# Patient Record
Sex: FEMALE | Race: BLACK OR AFRICAN AMERICAN | Employment: FULL TIME | URBAN - METROPOLITAN AREA
[De-identification: names, ages, dates, MRNs, and addresses within clinical notes are randomized per-mention and may not be internally consistent; named-entity substitution may affect disease eponyms.]

---

## 2019-04-11 ENCOUNTER — OP HISTORICAL/CONVERTED ENCOUNTER (OUTPATIENT)
Dept: OTHER | Age: 34
End: 2019-04-11

## 2019-05-07 ENCOUNTER — OP HISTORICAL/CONVERTED ENCOUNTER (OUTPATIENT)
Dept: OTHER | Age: 34
End: 2019-05-07

## 2019-12-03 ENCOUNTER — OP HISTORICAL/CONVERTED ENCOUNTER (OUTPATIENT)
Dept: OTHER | Age: 34
End: 2019-12-03

## 2021-07-30 ENCOUNTER — OFFICE VISIT (OUTPATIENT)
Dept: OBGYN CLINIC | Age: 36
End: 2021-07-30
Payer: COMMERCIAL

## 2021-07-30 VITALS
OXYGEN SATURATION: 99 % | BODY MASS INDEX: 35.56 KG/M2 | WEIGHT: 193.25 LBS | SYSTOLIC BLOOD PRESSURE: 124 MMHG | HEIGHT: 62 IN | HEART RATE: 87 BPM | DIASTOLIC BLOOD PRESSURE: 74 MMHG

## 2021-07-30 DIAGNOSIS — Z12.4 SCREENING FOR MALIGNANT NEOPLASM OF CERVIX: ICD-10-CM

## 2021-07-30 DIAGNOSIS — Z01.419 ROUTINE GYNECOLOGICAL EXAMINATION: Primary | ICD-10-CM

## 2021-07-30 DIAGNOSIS — N92.6 IRREGULAR MENSES: ICD-10-CM

## 2021-07-30 PROCEDURE — 99395 PREV VISIT EST AGE 18-39: CPT | Performed by: OBSTETRICS & GYNECOLOGY

## 2021-07-30 RX ORDER — NORGESTIMATE AND ETHINYL ESTRADIOL 0.25-0.035
1 KIT ORAL DAILY
Qty: 1 DOSE PACK | Refills: 13 | Status: SHIPPED | OUTPATIENT
Start: 2021-07-30 | End: 2022-10-27 | Stop reason: ALTCHOICE

## 2021-07-30 NOTE — PROGRESS NOTES
Homero Chadwick is a 38 y.o. female who presents today for the following:  Chief Complaint   Patient presents with    Annual Exam     c/o irregular bleeding and painful intercourse        No Known Allergies    Current Outpatient Medications   Medication Sig    norgestimate-ethinyl estradioL (ORTHO-CYCLEN, SPRINTEC) 0.25-35 mg-mcg tab Take 1 Tablet by mouth daily. No current facility-administered medications for this visit. Past Medical History:   Diagnosis Date    GERD (gastroesophageal reflux disease)     Headache     Hypercholesterolemia     Hypertension        Past Surgical History:   Procedure Laterality Date    HX TONSIL AND ADENOIDECTOMY      DC CRYOCAUTERY OF CERVIX         Family History   Problem Relation Age of Onset    Diabetes Maternal Grandmother     Hypertension Maternal Grandmother     Diabetes Paternal Grandmother     Hypertension Paternal Grandmother        Social History     Socioeconomic History    Marital status:      Spouse name: Not on file    Number of children: Not on file    Years of education: Not on file    Highest education level: Not on file   Occupational History    Not on file   Tobacco Use    Smoking status: Former Smoker    Smokeless tobacco: Never Used   Vaping Use    Vaping Use: Never used   Substance and Sexual Activity    Alcohol use: Not Currently    Drug use: Not on file    Sexual activity: Not on file   Other Topics Concern    Not on file   Social History Narrative    Not on file     Social Determinants of Health     Financial Resource Strain:     Difficulty of Paying Living Expenses:    Food Insecurity:     Worried About [employer] [employer] in the Last Year:     [address] [employer] in the Last Year:    Transportation Needs:     Lack of Transportation (Medical):      Lack of Transportation (Non-Medical):    Physical Activity:     Days of Exercise per Week:     Minutes of Exercise per Session:    Stress:     Feeling of Stress : Social Connections:     Frequency of Communication with Friends and Family:     Frequency of Social Gatherings with Friends and Family:     Attends Yarsani Services:     Active Member of Clubs or Organizations:     Attends Club or Organization Meetings:     Marital Status:    Intimate Partner Violence:     Fear of Current or Ex-Partner:     Emotionally Abused:     Physically Abused:     Sexually Abused:          HPI  Here today for annual exam.  Patient is being on oral contraception pills as a treatment for irregular periods. Needs refill. ROS   Review of Systems   Constitutional: Negative. HENT: Negative. Eyes: Negative. Respiratory: Negative. Cardiovascular: Negative. Gastrointestinal: Negative. Genitourinary: Negative. Musculoskeletal: Negative. Skin: Negative. Neurological: Negative. Endo/Heme/Allergies: Negative. Psychiatric/Behavioral: Negative. /74 (BP 1 Location: Left upper arm, BP Patient Position: Sitting, BP Cuff Size: Adult)   Pulse 87   Ht 5' 2\" (1.575 m)   Wt 193 lb 4 oz (87.7 kg)   LMP 07/03/2021   SpO2 99%   BMI 35.35 kg/m²    OBGyn Exam   Constitutional  · Appearance: well-nourished, well developed, alert, in no acute distress    HENT  · Head and Face: appears normal    Neck  · Inspection/Palpation: normal appearance, no masses or tenderness  · Lymph Nodes: no lymphadenopathy present  · Thyroid: gland size normal, nontender, no nodules or masses present on palpation    Breasts   Symmetric, no palpable masses, no tenderness, no skin changes, no nipple abnormality, no nipple discharge, no lymphadenopathy.     Chest  · Respiratory Effort: Even and unlabored  · Auscultation: normal breath sounds    Cardiovascular  · Heart:  · Auscultation: regular rate and rhythm without murmur    Gastrointestinal  · Abdominal Examination: abdomen non-tender to palpation, normal bowel sounds, no masses present  · Liver and spleen: no hepatomegaly present, spleen not palpable  · Hernias: no hernias identified    Genitourinary  · External Genitalia: normal appearance for age, no discharge present, no tenderness present, no inflammatory lesions present, no masses present, no atrophy present  · Vagina: normal vaginal vault without central or paravaginal defects, no discharge present, no inflammatory lesions present, no masses present  · Bladder: non-tender to palpation  · Urethra: appears normal  · Cervix: normal   · Uterus: normal size, shape and consistency  · Adnexa: no adnexal tenderness present, no adnexal masses present  · Perineum: perineum within normal limits, no evidence of trauma, no rashes or skin lesions present  · Anus: anus within normal limits, no hemorrhoids present  · Inguinal Lymph Nodes: no lymphadenopathy present    Skin  · General Inspection: no rash, no lesions identified    Neurologic/Psychiatric  · Mental Status:  · Orientation: grossly oriented to person, place and time  · Mood and Affect: mood normal, affect appropriate    No results found for this visit on 07/30/21. Orders Placed This Encounter    norgestimate-ethinyl estradioL (Susen Cassandraort) 0.25-35 mg-mcg tab     Sig: Take 1 Tablet by mouth daily. Dispense:  1 Dose Pack     Refill:  13    PAP IG, CT-NG-TV, APTIMA HPV AND RFX 45/40,31([id number]) ([facility])     Order Specific Question:   Pap Source? Answer:   Endocervical     Order Specific Question:   Total Hysterectomy? Answer:   No     Order Specific Question:   Supracervical Hysterectomy? Answer:   No     Order Specific Question:   Post Menopausal?     Answer:   No     Order Specific Question:   Hormone Therapy? Answer:   No     Order Specific Question:   IUD? Answer:   No     Order Specific Question:   Abnormal Bleeding? Answer:   No     Order Specific Question:   Pregnant     Answer:   No     Order Specific Question:   Post Partum?      Answer:   No     Order Specific Question: Date of LMP? Answer:   7/3/2021         1. Routine gynecological examination  Reviewed Pap smear/HPV, mammogram, bone density colonoscopy testing guidelines. Encouraged healthy lifestyle. Discussed importanceof getting annual exams. Discussed the risk of osteoporosis and recommended 1200 to 1500 mg of calcium as well as vitamin D supplementation daily. Recommended monthly self breast exams and instructed and demonstrated to the patient on the proper technique educated on the importance of healthy weight management and the significance of not smoking. 2. Screening for malignant neoplasm of cervix    - PAP IG, CT-NG-TV, APTIMA HPV AND RFX 58/54,12([id number])    3. Irregular menses    - norgestimate-ethinyl estradioL (Gemma Mcmillan) 0.25-35 mg-mcg tab; Take 1 Tablet by mouth daily. Dispense: 1 Dose Pack;  Refill: 13        Follow-up and Dispositions    · Return in about 1 year (around 7/30/2022) for Annual Exam.

## 2021-08-02 LAB
C TRACH RRNA CVX QL NAA+PROBE: NEGATIVE
CYTOLOGIST CVX/VAG CYTO: NORMAL
CYTOLOGY CVX/VAG DOC CYTO: NORMAL
CYTOLOGY CVX/VAG DOC THIN PREP: NORMAL
DX ICD CODE: NORMAL
HPV I/H RISK 4 DNA CVX QL PROBE+SIG AMP: NEGATIVE
Lab: NORMAL
N GONORRHOEA RRNA CVX QL NAA+PROBE: NEGATIVE
OTHER STN SPEC: NORMAL
STAT OF ADQ CVX/VAG CYTO-IMP: NORMAL
T VAGINALIS RRNA SPEC QL NAA+PROBE: NEGATIVE

## 2022-03-06 ENCOUNTER — HOSPITAL ENCOUNTER (EMERGENCY)
Age: 37
Discharge: HOME | End: 2022-03-07
Payer: COMMERCIAL

## 2022-03-06 DIAGNOSIS — R55 NEAR SYNCOPE: ICD-10-CM

## 2022-03-06 DIAGNOSIS — F41.1 ANXIETY STATE: ICD-10-CM

## 2022-03-06 DIAGNOSIS — F43.21 FEELING GRIEF: ICD-10-CM

## 2022-03-06 DIAGNOSIS — R42 DIZZINESS: Primary | ICD-10-CM

## 2022-03-06 PROCEDURE — 99284 EMERGENCY DEPT VISIT MOD MDM: CPT

## 2022-03-06 NOTE — Clinical Note
[facility] EMERGENCY DEPT  [address] [place] [id number]  [phone]    Work/School Note    Date: 3/6/2022    To Whom It May concern:    Katy Ojeda was seen and treated today in the emergency room by the following provider(s):  Nurse Practitioner: Ji Jolly NP. Katy Ojeda is excused from work/school on 03/07/22 and 03/08/22. She is medically clear to return to work/school on 3/9/2022.        Sincerely,          Esther Nguyen NP

## 2022-03-07 VITALS
RESPIRATION RATE: 16 BRPM | WEIGHT: 196 LBS | HEIGHT: 65 IN | DIASTOLIC BLOOD PRESSURE: 85 MMHG | SYSTOLIC BLOOD PRESSURE: 140 MMHG | OXYGEN SATURATION: 100 % | TEMPERATURE: 98.3 F | BODY MASS INDEX: 32.65 KG/M2 | HEART RATE: 94 BPM

## 2022-03-07 LAB
ALBUMIN SERPL-MCNC: 3.4 G/DL (ref 3.5–5)
ALBUMIN/GLOB SERPL: 0.8 {RATIO} (ref 1.1–2.2)
ALP SERPL-CCNC: 98 U/L (ref 45–117)
ALT SERPL-CCNC: 19 U/L (ref 12–78)
ANION GAP SERPL CALC-SCNC: 5 MMOL/L (ref 5–15)
APPEARANCE UR: ABNORMAL
AST SERPL W P-5'-P-CCNC: 16 U/L (ref 15–37)
ATRIAL RATE: 97 BPM
BACTERIA URNS QL MICRO: NEGATIVE /HPF
BASOPHILS # BLD: 0 K/UL (ref 0–0.1)
BASOPHILS NFR BLD: 0 % (ref 0–1)
BILIRUB SERPL-MCNC: 0.3 MG/DL (ref 0.2–1)
BILIRUB UR QL: NEGATIVE
BUN SERPL-MCNC: 9 MG/DL (ref 6–20)
BUN/CREAT SERPL: 12 (ref 12–20)
CA-I BLD-MCNC: 9.6 MG/DL (ref 8.5–10.1)
CALCULATED P AXIS, ECG09: 56 DEGREES
CALCULATED R AXIS, ECG10: 11 DEGREES
CALCULATED T AXIS, ECG11: 34 DEGREES
CHLORIDE SERPL-SCNC: 104 MMOL/L (ref 97–108)
CO2 SERPL-SCNC: 26 MMOL/L (ref 21–32)
COLOR UR: ABNORMAL
CREAT SERPL-MCNC: 0.75 MG/DL (ref 0.55–1.02)
DIAGNOSIS, 93000: NORMAL
DIFFERENTIAL METHOD BLD: ABNORMAL
EOSINOPHIL # BLD: 0 K/UL (ref 0–0.4)
EOSINOPHIL NFR BLD: 0 % (ref 0–7)
ERYTHROCYTE [DISTWIDTH] IN BLOOD BY AUTOMATED COUNT: 12.3 % (ref 11.5–14.5)
GLOBULIN SER CALC-MCNC: 4.5 G/DL (ref 2–4)
GLUCOSE SERPL-MCNC: 133 MG/DL (ref 65–100)
GLUCOSE UR STRIP.AUTO-MCNC: NEGATIVE MG/DL
HCG SERPL QL: NEGATIVE
HCT VFR BLD AUTO: 40.4 % (ref 35–47)
HGB BLD-MCNC: 13.5 G/DL (ref 11.5–16)
HGB UR QL STRIP: NEGATIVE
IMM GRANULOCYTES # BLD AUTO: 0.1 K/UL (ref 0–0.04)
IMM GRANULOCYTES NFR BLD AUTO: 0 % (ref 0–0.5)
KETONES UR QL STRIP.AUTO: NEGATIVE MG/DL
LEUKOCYTE ESTERASE UR QL STRIP.AUTO: NEGATIVE
LYMPHOCYTES # BLD: 2.3 K/UL (ref 0.8–3.5)
LYMPHOCYTES NFR BLD: 18 % (ref 12–49)
MCH RBC QN AUTO: 29 PG (ref 26–34)
MCHC RBC AUTO-ENTMCNC: 33.4 G/DL (ref 30–36.5)
MCV RBC AUTO: 86.7 FL (ref 80–99)
MONOCYTES # BLD: 0.9 K/UL (ref 0–1)
MONOCYTES NFR BLD: 7 % (ref 5–13)
MUCOUS THREADS URNS QL MICRO: ABNORMAL /LPF
NEUTS SEG # BLD: 9.3 K/UL (ref 1.8–8)
NEUTS SEG NFR BLD: 75 % (ref 32–75)
NITRITE UR QL STRIP.AUTO: NEGATIVE
NRBC # BLD: 0 K/UL (ref 0–0.01)
NRBC BLD-RTO: 0 PER 100 WBC
P-R INTERVAL, ECG05: 138 MS
PH UR STRIP: 5 [PH] (ref 5–8)
PLATELET # BLD AUTO: 271 K/UL (ref 150–400)
PMV BLD AUTO: 10.8 FL (ref 8.9–12.9)
POTASSIUM SERPL-SCNC: 3.3 MMOL/L (ref 3.5–5.1)
PROT SERPL-MCNC: 7.9 G/DL (ref 6.4–8.2)
PROT UR STRIP-MCNC: NEGATIVE MG/DL
Q-T INTERVAL, ECG07: 350 MS
QRS DURATION, ECG06: 62 MS
QTC CALCULATION (BEZET), ECG08: 444 MS
RBC # BLD AUTO: 4.66 M/UL (ref 3.8–5.2)
RBC #/AREA URNS HPF: ABNORMAL /HPF (ref 0–5)
SODIUM SERPL-SCNC: 135 MMOL/L (ref 136–145)
SP GR UR REFRACTOMETRY: 1.02 (ref 1–1.03)
TROPONIN-HIGH SENSITIVITY: 4 NG/L (ref 0–51)
TSH SERPL DL<=0.05 MIU/L-ACNC: 1.61 UIU/ML (ref 0.36–3.74)
UA: UC IF INDICATED,UAUC: ABNORMAL
UROBILINOGEN UR QL STRIP.AUTO: 0.1 EU/DL (ref 0.1–1)
VENTRICULAR RATE, ECG03: 97 BPM
WBC # BLD AUTO: 12.5 K/UL (ref 3.6–11)
WBC URNS QL MICRO: ABNORMAL /HPF (ref 0–4)

## 2022-03-07 PROCEDURE — 84484 ASSAY OF TROPONIN QUANT: CPT

## 2022-03-07 PROCEDURE — 74011250637 HC RX REV CODE- 250/637: Performed by: NURSE PRACTITIONER

## 2022-03-07 PROCEDURE — 36415 COLL VENOUS BLD VENIPUNCTURE: CPT

## 2022-03-07 PROCEDURE — 80053 COMPREHEN METABOLIC PANEL: CPT

## 2022-03-07 PROCEDURE — 93005 ELECTROCARDIOGRAM TRACING: CPT

## 2022-03-07 PROCEDURE — 84443 ASSAY THYROID STIM HORMONE: CPT

## 2022-03-07 PROCEDURE — 84703 CHORIONIC GONADOTROPIN ASSAY: CPT

## 2022-03-07 PROCEDURE — 85025 COMPLETE CBC W/AUTO DIFF WBC: CPT

## 2022-03-07 PROCEDURE — 81001 URINALYSIS AUTO W/SCOPE: CPT

## 2022-03-07 RX ORDER — HYDROXYZINE PAMOATE 25 MG/1
25 CAPSULE ORAL
Status: COMPLETED | OUTPATIENT
Start: 2022-03-07 | End: 2022-03-07

## 2022-03-07 RX ORDER — ATORVASTATIN CALCIUM 10 MG/1
TABLET, FILM COATED ORAL DAILY
COMMUNITY
End: 2022-10-27 | Stop reason: ALTCHOICE

## 2022-03-07 RX ORDER — HYDROXYZINE 25 MG/1
25 TABLET, FILM COATED ORAL
Qty: 10 TABLET | Refills: 0 | Status: SHIPPED | OUTPATIENT
Start: 2022-03-07 | End: 2022-03-17

## 2022-03-07 RX ORDER — LISINOPRIL 10 MG/1
TABLET ORAL DAILY
COMMUNITY
End: 2022-10-27 | Stop reason: ALTCHOICE

## 2022-03-07 RX ADMIN — HYDROXYZINE PAMOATE 25 MG: 25 CAPSULE ORAL at 02:07

## 2022-03-07 NOTE — ED TRIAGE NOTES
Pt reports earlier in the day that she felt dizzy and weak had a fall. Denies LOC or hitting head.  Pt has hx of anxiety

## 2022-03-07 NOTE — DISCHARGE INSTRUCTIONS
Thank you! Thank you for allowing me to care for you in the emergency department. I sincerely hope that you are satisfied with your visit today. It is my goal to provide you with excellent care. Below you will find a list of your labs and imaging from your visit today. Should you have any questions regarding these results please do not hesitate to call the emergency department. Labs -     Recent Results (from the past 12 hour(s))   CBC WITH AUTOMATED DIFF    Collection Time: 03/07/22 12:05 AM   Result Value Ref Range    WBC 12.5 (H) 3.6 - 11.0 K/uL    RBC 4.66 3.80 - 5.20 M/uL    HGB 13.5 11.5 - 16.0 g/dL    HCT 40.4 35.0 - 47.0 %    MCV 86.7 80.0 - 99.0 FL    MCH 29.0 26.0 - 34.0 PG    MCHC 33.4 30.0 - 36.5 g/dL    RDW 12.3 11.5 - 14.5 %    PLATELET 749 079 - 493 K/uL    MPV 10.8 8.9 - 12.9 FL    NRBC 0.0 0.0  WBC    ABSOLUTE NRBC 0.00 0.00 - 0.01 K/uL    NEUTROPHILS 75 32 - 75 %    LYMPHOCYTES 18 12 - 49 %    MONOCYTES 7 5 - 13 %    EOSINOPHILS 0 0 - 7 %    BASOPHILS 0 0 - 1 %    IMMATURE GRANULOCYTES 0 0 - 0.5 %    ABS. NEUTROPHILS 9.3 (H) 1.8 - 8.0 K/UL    ABS. LYMPHOCYTES 2.3 0.8 - 3.5 K/UL    ABS. MONOCYTES 0.9 0.0 - 1.0 K/UL    ABS. EOSINOPHILS 0.0 0.0 - 0.4 K/UL    ABS. BASOPHILS 0.0 0.0 - 0.1 K/UL    ABS. IMM. GRANS. 0.1 (H) 0.00 - 0.04 K/UL    DF AUTOMATED     METABOLIC PANEL, COMPREHENSIVE    Collection Time: 03/07/22 12:05 AM   Result Value Ref Range    Sodium 135 (L) 136 - 145 mmol/L    Potassium 3.3 (L) 3.5 - 5.1 mmol/L    Chloride 104 97 - 108 mmol/L    CO2 26 21 - 32 mmol/L    Anion gap 5 5 - 15 mmol/L    Glucose 133 (H) 65 - 100 mg/dL    BUN 9 6 - 20 mg/dL    Creatinine 0.75 0.55 - 1.02 mg/dL    BUN/Creatinine ratio 12 12 - 20      GFR est AA >60 >60 ml/min/1.73m2    GFR est non-AA >60 >60 ml/min/1.73m2    Calcium 9.6 8.5 - 10.1 mg/dL    Bilirubin, total 0.3 0.2 - 1.0 mg/dL    AST (SGOT) 16 15 - 37 U/L    ALT (SGPT) 19 12 - 78 U/L    Alk.  phosphatase 98 45 - 117 U/L    Protein, total 7.9 6.4 - 8.2 g/dL    Albumin 3.4 (L) 3.5 - 5.0 g/dL    Globulin 4.5 (H) 2.0 - 4.0 g/dL    A-G Ratio 0.8 (L) 1.1 - 2.2     URINALYSIS W/ REFLEX CULTURE    Collection Time: 03/07/22 12:05 AM    Specimen: Urine   Result Value Ref Range    Color Yellow/Straw      Appearance Turbid (A) Clear      Specific gravity 1.017 1.003 - 1.030      pH (UA) 5.0 5.0 - 8.0      Protein Negative Negative mg/dL    Glucose Negative Negative mg/dL    Ketone Negative Negative mg/dL    Bilirubin Negative Negative      Blood Negative Negative      Urobilinogen 0.1 0.1 - 1.0 EU/dL    Nitrites Negative Negative      Leukocyte Esterase Negative Negative      UA:UC IF INDICATED PENDING     WBC 0-4 0 - 4 /hpf    RBC 0-5 0 - 5 /hpf    Bacteria Negative Negative /hpf    Mucus Trace /lpf   TROPONIN-HIGH SENSITIVITY    Collection Time: 03/07/22 12:05 AM   Result Value Ref Range    Troponin-High Sensitivity 4 0 - 51 ng/L   HCG QL SERUM    Collection Time: 03/07/22 12:05 AM   Result Value Ref Range    HCG, Ql. Negative Negative     TSH 3RD GENERATION    Collection Time: 03/07/22 12:05 AM   Result Value Ref Range    TSH 1.61 0.36 - 3.74 uIU/mL       Radiologic Studies -   No orders to display     CT Results  (Last 48 hours)      None          CXR Results  (Last 48 hours)      None               If you feel that you have not received excellent quality care or timely care, please ask to speak to the nurse manager. Please choose us in the future for your continued health care needs. ------------------------------------------------------------------------------------------------------------  The exam and treatment you received in the Emergency Department were for an urgent problem and are not intended as complete care. It is important that you follow-up with a doctor, nurse practitioner, or physician assistant to:  (1) confirm your diagnosis,  (2) re-evaluation of changes in your illness and treatment, and  (3) for ongoing care.   If your symptoms become worse or you do not improve as expected and you are unable to reach your usual health care provider, you should return to the Emergency Department. We are available 24 hours a day. Please take your discharge instructions with you when you go to your follow-up appointment. If you have any problem arranging a follow-up appointment, contact the Emergency Department immediately. If a prescription has been provided, please have it filled as soon as possible to prevent a delay in treatment. Read the entire medication instruction sheet provided to you by the pharmacy. If you have any questions or reservations about taking the medication due to side effects or interactions with other medications, please call your primary care physician or contact the ER to speak with the charge nurse. Make an appointment with your family doctor or the physician you were referred to for follow-up of this visit as instructed on your discharge paperwork, as this is a mandatory follow-up. Return to the ER if you are unable to be seen or if you are unable to be seen in a timely manner. If you have any problem arranging the follow-up visit, contact the Emergency Department immediately.

## 2022-03-07 NOTE — ED PROVIDER NOTES
EMERGENCY DEPARTMENT HISTORY AND PHYSICAL EXAM      Date: 3/6/2022  Patient Name: Maribel Bird    History of Presenting Illness     Chief Complaint   Patient presents with    Dizziness       History Provided By: Patient and Patient's Mother    HPI: Maribel Bird, 43 y.o. female with a past medical history significant hypertension, hyperlipidemia, obesity and anxiety, phobia, depression presents to the ED with cc of dizziness with near syncope this afternoon. Patient and significant other's mother both relay that they were having a pleasant conversation over lunch and upon standing and ambulating the patient describes becoming light-headed with the room spinning and black spots and she had a guided descent to the floor without injury and returned to her usual LOC shortly after with no return of symptoms. She did not want to come to the ED earlier in the day due to phobia of hospitals and anxiety but has come now with family support. She reports increased anxiety, difficulty sleeping, poor eating habits, drinking soda and not water, and not following up with her PCP for her htn since her mother's passing several months ago. Denies SI or any attempts, AH, VH, or other psychiatric history. There are no other complaints, changes, or physical findings at this time. PCP: None    No current facility-administered medications on file prior to encounter. Current Outpatient Medications on File Prior to Encounter   Medication Sig Dispense Refill    lisinopriL (PRINIVIL, ZESTRIL) 10 mg tablet Take  by mouth daily.  atorvastatin (LIPITOR) 10 mg tablet Take  by mouth daily.  norgestimate-ethinyl estradioL (ORTHO-CYCLEN, SPRINTEC) 0.25-35 mg-mcg tab Take 1 Tablet by mouth daily.  (Patient not taking: Reported on 3/7/2022) 1 Dose Pack 13       Past History     Past Medical History:  Past Medical History:   Diagnosis Date    Anxiety     GERD (gastroesophageal reflux disease)     Headache     Hypercholesterolemia     Hypertension     Obesity     Phobia     Psychiatric disorder        Past Surgical History:  Past Surgical History:   Procedure Laterality Date    HX TONSIL AND ADENOIDECTOMY      [place] CRYOCAUTERY OF CERVIX         Family History:  Family History   Problem Relation Age of Onset    Diabetes Maternal Grandmother     Hypertension Maternal Grandmother     Diabetes Paternal Grandmother     Hypertension Paternal Grandmother        Social History:  Social History     Tobacco Use    Smoking status: Former Smoker    Smokeless tobacco: Never Used   Vaping Use    Vaping Use: Never used   Substance Use Topics    Alcohol use: Not Currently    Drug use: Never       Allergies:  No Known Allergies      Review of Systems     Review of Systems   Constitutional: Negative for activity change, appetite change, chills, diaphoresis, fatigue, fever and unexpected weight change. HENT: Negative for congestion, nosebleeds and tinnitus. Eyes: Negative for photophobia and visual disturbance. Respiratory: Negative for cough, chest tightness, shortness of breath and wheezing. Cardiovascular: Negative for chest pain, palpitations and leg swelling. Gastrointestinal: Negative for abdominal pain, constipation, diarrhea, nausea and vomiting. Endocrine: Negative for cold intolerance and heat intolerance. Genitourinary: Positive for menstrual problem. Negative for decreased urine volume, dysuria, flank pain, frequency, pelvic pain, urgency, vaginal bleeding, vaginal discharge and vaginal pain. Musculoskeletal: Negative for back pain, myalgias and neck pain. Skin: Negative. Allergic/Immunologic: Negative. Neurological: Positive for dizziness, syncope and light-headedness. Negative for tremors, seizures, facial asymmetry, speech difficulty, weakness, numbness and headaches. Hematological: Negative.     Psychiatric/Behavioral: Negative for hallucinations, self-injury, sleep disturbance and suicidal ideas. The patient is nervous/anxious. All other systems reviewed and are negative. Physical Exam     Physical Exam  Vitals and nursing note reviewed. Constitutional:       General: She is not in acute distress. Appearance: She is well-groomed. She is obese. She is not ill-appearing, toxic-appearing or diaphoretic. HENT:      Head: Normocephalic. Right Ear: Tympanic membrane, ear canal and external ear normal.      Left Ear: Tympanic membrane, ear canal and external ear normal.      Nose: Nose normal. No congestion. Mouth/Throat:      Mouth: Mucous membranes are moist.      Pharynx: Oropharynx is clear. Eyes:      General: No scleral icterus. Extraocular Movements: Extraocular movements intact. Conjunctiva/sclera: Conjunctivae normal.      Pupils: Pupils are equal, round, and reactive to light. Cardiovascular:      Rate and Rhythm: Normal rate and regular rhythm. Pulses: Normal pulses. Heart sounds: Normal heart sounds. Pulmonary:      Effort: Pulmonary effort is normal. No respiratory distress. Breath sounds: Normal breath sounds. Chest:      Chest wall: No tenderness. Abdominal:      General: Bowel sounds are normal.      Palpations: Abdomen is soft. Tenderness: There is no abdominal tenderness. Musculoskeletal:         General: Normal range of motion. Cervical back: Normal range of motion and neck supple. Right lower leg: No edema. Left lower leg: No edema. Skin:     General: Skin is warm and dry. Capillary Refill: Capillary refill takes less than 2 seconds. Neurological:      General: No focal deficit present. Mental Status: She is alert and oriented to person, place, and time. Sensory: No sensory deficit. Motor: No weakness. Gait: Gait normal.   Psychiatric:         Attention and Perception: Attention and perception normal. She does not perceive auditory or visual hallucinations.          Mood and Affect: Mood is anxious and depressed. Affect is tearful. Speech: Speech normal. Speech is not rapid and pressured, delayed, slurred or tangential.         Behavior: Behavior normal. Behavior is cooperative. Thought Content: Thought content is not paranoid or delusional. Thought content does not include homicidal or suicidal ideation. Thought content does not include homicidal or suicidal plan. Lab and Diagnostic Study Results     Labs -   No results found for this or any previous visit (from the past 12 hour(s)). Radiologic Studies -   @lastxrresult@  CT Results  (Last 48 hours)    None        CXR Results  (Last 48 hours)    None            Medical Decision Making   - I am the first provider for this patient. - I reviewed the vital signs, available nursing notes, past medical history, past surgical history, family history and social history. - Initial assessment performed. The patients presenting problems have been discussed, and they are in agreement with the care plan formulated and outlined with them. I have encouraged them to ask questions as they arise throughout their visit. Vital Signs-Reviewed the patient's vital signs. Patient Vitals for the past 12 hrs:   Temp Pulse Resp BP SpO2   03/06/22 2337 98.3 °F (36.8 °C) 98 16 (!) 141/88 100 %       Records Reviewed: Nursing Notes and Old Medical Records    The patient presents with near syncope, anxiety, sleep difficulty, with a differential diagnosis of anxiety, grief response, hypovolemia, hyper/hypoglycemia, uncontrolled hypertension, arrhythmia, ACS. Electrolyte or metabolic disturbance, thyroid disease      ED Course:     ED Course as of 03/20/22 2357   Mon Mar 07, 2022   0105 Patient given diagnostic results thus far and reassured. She reports feeling more at ease and has not had any recurrence of dizziness or development of new symptoms. Ambulatory with steady upright gait to the bathroom for urine sample.  Mother also voices that patient is calmer and more at ease. [KR]   Workup negative for any acute abnormality including thyroid studies. Minimally elevated WBC without evidence of infection or complaints. U/A negative. Nonfasting blood glucose not excessive and without evidence of DKA. ECG unremarkable for acute process. Patient receptive to education and was also receptive to resources and conversation with behavioral health intake for grief counseling, anxiety, and phobias. She was provided with a new PCP referral and she voiced feeling greatly improved, demonstrated good family support with their presence during entire visit. Patient safe and appropriate for discharge after speaking with behavioral intake as well. ED Course User Index  [KR] Lilia Fitzpatrick NP       Provider Notes (Medical Decision Making):     MDM  Number of Diagnoses or Management Options  Anxiety state: new, no workup  Dizziness: new, needed workup  Feeling grief: new, no workup  Near syncope: new, needed workup     Amount and/or Complexity of Data Reviewed  Clinical lab tests: ordered and reviewed  Tests in the radiology section of CPT®: reviewed and ordered  Obtain history from someone other than the patient: yes  Discuss the patient with other providers: yes  Independent visualization of images, tracings, or specimens: yes    Risk of Complications, Morbidity, and/or Mortality  Presenting problems: low  Diagnostic procedures: low  Management options: low    Patient Progress  Patient progress: improved           Disposition   Disposition: Condition stable, improved and resolved  DC- Adult Discharges: All of the diagnostic tests were reviewed and questions answered. Diagnosis, care plan and treatment options were discussed. The patient understands the instructions and will follow up as directed. The patients results have been reviewed with them. They have been counseled regarding their diagnosis.   The patient verbally convey understanding and agreement of the signs, symptoms, diagnosis, treatment and prognosis and additionally agrees to follow up as recommended with their PCP in 24 - 48 hours. They also agree with the care-plan and convey that all of their questions have been answered. I have also put together some discharge instructions for them that include: 1) educational information regarding their diagnosis, 2) how to care for their diagnosis at home, as well a 3) list of reasons why they would want to return to the ED prior to their follow-up appointment, should their condition change. DISCHARGE PLAN:  1. Current Discharge Medication List      CONTINUE these medications which have NOT CHANGED    Details   norgestimate-ethinyl estradioL (ORTHO-CYCLEN, SPRINTEC) 0.25-35 mg-mcg tab Take 1 Tablet by mouth daily. Qty: 1 Dose Pack, Refills: 13    Associated Diagnoses: Irregular menses           2. Follow-up Information     Follow up With Specialties Details Why Contact Info    Your new provider at [facility] office    Call and make appointment for planning out your routine medical care and follow up with lab monitoring and medications. Follow up with primary care, urgent care, or this Emergency department   As needed, If symptoms worsen         3. Return to ED if worse   4. Discharge Medication List as of 3/7/2022  2:59 AM      START taking these medications    Details   hydrOXYzine HCL (ATARAX) 25 mg tablet Take 1 Tablet by mouth nightly as needed for Anxiety or Sleep for up to 10 days. , Normal, Disp-10 Tablet, R-0         CONTINUE these medications which have NOT CHANGED    Details   lisinopriL (PRINIVIL, ZESTRIL) 10 mg tablet Take  by mouth daily. , Historical Med      atorvastatin (LIPITOR) 10 mg tablet Take  by mouth daily. , Historical Med      norgestimate-ethinyl estradioL (ORTHO-CYCLEN, SPRINTEC) 0.25-35 mg-mcg tab Take 1 Tablet by mouth daily. , Normal, Disp-1 Dose Pack, R-13               Diagnosis     Clinical Impression:   1. Dizziness    2. Near syncope    3. Anxiety state    4. Feeling grief        Attestations:    Moo Moctezuma NP    Please note that this dictation was completed with [employer], the computer voice recognition software. Quite often unanticipated grammatical, syntax, homophones, and other interpretive errors are inadvertently transcribed by the computer software. Please disregard these errors. Please excuse any errors that have escaped final proofreading. Thank you.

## 2022-06-23 ENCOUNTER — INITIAL PRENATAL (OUTPATIENT)
Dept: OBGYN CLINIC | Age: 37
End: 2022-06-23
Payer: COMMERCIAL

## 2022-06-23 VITALS
OXYGEN SATURATION: 100 % | WEIGHT: 188.44 LBS | TEMPERATURE: 97.1 F | DIASTOLIC BLOOD PRESSURE: 94 MMHG | HEART RATE: 93 BPM | RESPIRATION RATE: 16 BRPM | SYSTOLIC BLOOD PRESSURE: 145 MMHG | HEIGHT: 65 IN | BODY MASS INDEX: 31.39 KG/M2

## 2022-06-23 DIAGNOSIS — Z11.3 SCREENING FOR STDS (SEXUALLY TRANSMITTED DISEASES): ICD-10-CM

## 2022-06-23 DIAGNOSIS — Z32.00 ENCOUNTER FOR CONFIRMATION OF PREGNANCY TEST RESULT WITH PHYSICAL EXAMINATION: Primary | ICD-10-CM

## 2022-06-23 DIAGNOSIS — O10.919 CHRONIC HYPERTENSION AFFECTING PREGNANCY: ICD-10-CM

## 2022-06-23 DIAGNOSIS — N91.2 AMENORRHEA: ICD-10-CM

## 2022-06-23 DIAGNOSIS — O09.511 PRIMIGRAVIDA OF ADVANCED MATERNAL AGE IN FIRST TRIMESTER: ICD-10-CM

## 2022-06-23 PROCEDURE — 76801 OB US < 14 WKS SINGLE FETUS: CPT | Performed by: OBSTETRICS & GYNECOLOGY

## 2022-06-23 PROCEDURE — 99213 OFFICE O/P EST LOW 20 MIN: CPT | Performed by: OBSTETRICS & GYNECOLOGY

## 2022-06-23 RX ORDER — ASPIRIN 81 MG/1
81 TABLET ORAL DAILY
Qty: 90 TABLET | Refills: 3 | Status: SHIPPED | OUTPATIENT
Start: 2022-06-23

## 2022-06-23 RX ORDER — LABETALOL 200 MG/1
200 TABLET, FILM COATED ORAL 2 TIMES DAILY
Qty: 60 TABLET | Refills: 3 | Status: SHIPPED | OUTPATIENT
Start: 2022-06-23 | End: 2022-10-14

## 2022-06-23 RX ORDER — METOPROLOL TARTRATE 25 MG/1
TABLET, FILM COATED ORAL
COMMUNITY
Start: 2022-06-07 | End: 2022-06-23

## 2022-06-23 NOTE — PROGRESS NOTES
Minnie Rosenbaum is a 43 y.o. female who presents today for the following:  Chief Complaint   Patient presents with    Initial Prenatal Visit        No Known Allergies    Current Outpatient Medications   Medication Sig    labetaloL (NORMODYNE) 200 mg tablet Take 1 Tablet by mouth two (2) times a day. Indications: pre-existing hypertension during pregnancy    aspirin delayed-release 81 mg tablet Take 1 Tablet by mouth daily.  lisinopriL (PRINIVIL, ZESTRIL) 10 mg tablet Take  by mouth daily. (Patient not taking: Reported on 6/23/2022)    atorvastatin (LIPITOR) 10 mg tablet Take  by mouth daily. (Patient not taking: Reported on 6/23/2022)    norgestimate-ethinyl estradioL (ORTHO-CYCLEN, SPRINTEC) 0.25-35 mg-mcg tab Take 1 Tablet by mouth daily. (Patient not taking: Reported on 3/7/2022)     No current facility-administered medications for this visit.        Past Medical History:   Diagnosis Date    Anxiety     GERD (gastroesophageal reflux disease)     Headache     Hypercholesterolemia     Hypertension     Obesity     Phobia     Psychiatric disorder        Past Surgical History:   Procedure Laterality Date    HX TONSIL AND ADENOIDECTOMY      [place] CRYOCAUTERY OF CERVIX         Family History   Problem Relation Age of Onset    Diabetes Maternal Grandmother     Hypertension Maternal Grandmother     Diabetes Paternal Grandmother     Hypertension Paternal Grandmother        Social History     Socioeconomic History    Marital status:      Spouse name: Not on file    Number of children: Not on file    Years of education: Not on file    Highest education level: Not on file   Occupational History    Not on file   Tobacco Use    Smoking status: Former Smoker    Smokeless tobacco: Never Used   Vaping Use    Vaping Use: Never used   Substance and Sexual Activity    Alcohol use: Not Currently    Drug use: Never    Sexual activity: Yes     Birth control/protection: None     Comment: Stopped birth control 2 months ago   Other Topics Concern    Not on file   Social History Narrative    Not on file     Social Determinants of Health     Financial Resource Strain:     Difficulty of Paying Living Expenses: Not on file   Food Insecurity:     Worried About Running Out of Food in the Last Year: Not on file    [place] of Food in the Last Year: Not on file   Transportation Needs:     Lack of Transportation (Medical): Not on file    Lack of Transportation (Non-Medical): Not on file   Physical Activity:     Days of Exercise per Week: Not on file    Minutes of Exercise per Session: Not on file   Stress:     Feeling of Stress : Not on file   Social Connections:     Frequency of Communication with Friends and Family: Not on file    Frequency of Social Gatherings with Friends and Family: Not on file    Attends Latter-day Services: Not on file    Active Member of [address] [place] [address] [employer] or Organizations: Not on file    Attends Club or Organization Meetings: Not on file    Marital Status: Not on file   Intimate Partner Violence:     Fear of Current or Ex-Partner: Not on file    Emotionally Abused: Not on file    Physically Abused: Not on file    Sexually Abused: Not on file   Housing Stability:     Unable to Pay for Housing in the Last Year: Not on file    Number of [place] in the Last Year: Not on file    Unstable Housing in the Last Year: Not on file         HPI  43years old patient presented today for pregnancy confirmation after findings of a positive pregnancy test.  She has history of chronic hypertension. ROS   Review of Systems   Constitutional: Negative. HENT: Negative. Eyes: Negative. Respiratory: Negative. Cardiovascular: Negative. Gastrointestinal: Negative. Genitourinary: Negative. Musculoskeletal: Negative. Skin: Negative. Neurological: Negative. Endo/Heme/Allergies: Negative. Psychiatric/Behavioral: Negative.       BP (!) 145/94 (BP 1 Location: Left upper arm, BP Patient Position: Sitting, BP Cuff Size: Adult)   Pulse 93   Temp 97.1 °F (36.2 °C) (Temporal)   Resp 16   Ht 5' 5\" (1.651 m)   Wt 188 lb 7 oz (85.5 kg)   LMP 04/15/2022 (Approximate)   SpO2 100%   BMI 31.36 kg/m²    OBGyn Exam   Constitutional  · Appearance: well-nourished, well developed, alert, in no acute distress    HENT  · Head and Face: appears normal    Neck  · Inspection/Palpation: normal appearance, no masses or tenderness  · Lymph Nodes: no lymphadenopathy present  · Thyroid: gland size normal, nontender, no nodules or masses present on palpation    Chest  · Respiratory Effort: Even and unlabored  · Auscultation: normal breath sounds    Cardiovascular  · Heart:  · Auscultation: regular rate and rhythm without murmur    Gastrointestinal  · Abdominal Examination: abdomen non-tender to palpation, normal bowel sounds, no masses present  · Liver and spleen: no hepatomegaly present, spleen not palpable  · Hernias: no hernias identified    Genitourinary  · External Genitalia: normal appearance for age, no discharge present, no tenderness present, no inflammatory lesions present, no masses present, no atrophy present  · Vagina: normal vaginal vault without central or paravaginal defects, no discharge present, no inflammatory lesions present, no masses present  · Bladder: non-tender to palpation  · Urethra: appears normal  · Cervix: normal   · Uterus: normal size, shape and consistency  · Adnexa: no adnexal tenderness present, no adnexal masses present  · Perineum: perineum within normal limits, no evidence of trauma, no rashes or skin lesions present  · Anus: anus within normal limits, no hemorrhoids present  · Inguinal Lymph Nodes: no lymphadenopathy present    Skin  · General Inspection: no rash, no lesions identified    Neurologic/Psychiatric  · Mental Status:  · Orientation: grossly oriented to person, place and time  · Mood and Affect: mood normal, affect appropriate    Results for orders placed or performed in visit on 06/23/22   AMB POC US OB < 14 WKS, 1ST GESTATION    Narrative    A transabdominal pelvic ultrasound performed today, June 23, 2022, shows an intrauterine, single, viable pregnancy. Embryo measures 5.46 cm of crown-rump length, compatible with 12 weeks gestation. Gestational sac measures 7.22 cm. Embryonic heart motion noted at 166 bpm.  Cervix appears closed. Diagnostic impression: Intrauterine pregnancy at 12 weeks gestation. RADHA: January 5, 2023. Orders Placed This Encounter    AMB POC US OB < 14 WKS, 1ST GESTATION     Order Specific Question:   Reason for Exam     Answer:   Dating     Order Specific Question:   Is Patient Pregnant? Answer: Yes    CT/NG/T.VAGINALIS AMPLIFICATION     Order Specific Question:   Specimen source     Answer:   Vaginal [516]    PRENATAL PROFILE I    HIV 1/2 AG/AB, 4TH GENERATION,W RFLX CONFIRM    TSH AND FREE T4 ([facility] Default)    HEPATITIS C AB    CYSTIC FIBROSIS MUTATION 97    HEMOGLOBIN FRACTIONATION    DISCONTD: metoprolol tartrate (LOPRESSOR) 25 mg tablet    labetaloL (NORMODYNE) 200 mg tablet     Sig: Take 1 Tablet by mouth two (2) times a day. Indications: pre-existing hypertension during pregnancy     Dispense:  60 Tablet     Refill:  3    aspirin delayed-release 81 mg tablet     Sig: Take 1 Tablet by mouth daily. Dispense:  90 Tablet     Refill:  3         1. Encounter for confirmation of pregnancy test result with physical examination    - PRENATAL PROFILE I  - HIV 1/2 AG/AB, 4TH GENERATION,W RFLX CONFIRM  - TSH AND FREE T4  - HEPATITIS C AB  - CYSTIC FIBROSIS MUTATION 97  - HEMOGLOBIN FRACTIONATION  - AMB POC US OB < 14 WKS, 1ST GESTATION    2. Amenorrhea      3. Screening for STDs (sexually transmitted diseases)    - CT/NG/T.VAGINALIS AMPLIFICATION    4. Chronic hypertension affecting pregnancy    - labetaloL (NORMODYNE) 200 mg tablet; Take 1 Tablet by mouth two (2) times a day.  Indications: pre-existing hypertension during pregnancy  Dispense: 60 Tablet; Refill: 3  - aspirin delayed-release 81 mg tablet; Take 1 Tablet by mouth daily. Dispense: 90 Tablet; Refill: 3    5. Primigravida of advanced maternal age in first trimester      REFERRAL TO [facility]. Follow-up and Dispositions    · Return in about 2 weeks (around 7/7/2022) for [place] [address].

## 2022-06-29 LAB
C TRACH RRNA SPEC QL NAA+PROBE: NEGATIVE
N GONORRHOEA RRNA SPEC QL NAA+PROBE: NEGATIVE
T VAGINALIS RRNA SPEC QL NAA+PROBE: NEGATIVE

## 2022-07-01 LAB
ABO GROUP BLD: NORMAL
BASOPHILS # BLD AUTO: 0 X10E3/UL (ref 0–0.2)
BASOPHILS NFR BLD AUTO: 0 %
BLD GP AB SCN SERPL QL: NEGATIVE
CFTR MUT ANL BLD/T: NORMAL
EOSINOPHIL # BLD AUTO: 0 X10E3/UL (ref 0–0.4)
EOSINOPHIL NFR BLD AUTO: 0 %
ERYTHROCYTE [DISTWIDTH] IN BLOOD BY AUTOMATED COUNT: 12.8 % (ref 11.7–15.4)
GENE DIS ANL CARRIER INTERP-IMP: NORMAL
HBV SURFACE AG SERPL QL IA: NEGATIVE
HCT VFR BLD AUTO: 38.7 % (ref 34–46.6)
HCV AB S/CO SERPL IA: <0.1 S/CO RATIO (ref 0–0.9)
HGB A MFR BLD ELPH: 97.5 % (ref 96.4–98.8)
HGB A2 MFR BLD ELPH: 2.5 % (ref 1.8–3.2)
HGB BLD-MCNC: 12.5 G/DL (ref 11.1–15.9)
HGB F MFR BLD ELPH: 0 % (ref 0–2)
HGB FRACT BLD-IMP: NORMAL
HGB S MFR BLD ELPH: 0 %
HIV 1+2 AB+HIV1 P24 AG SERPL QL IA: NON REACTIVE
IMM GRANULOCYTES # BLD AUTO: 0 X10E3/UL (ref 0–0.1)
IMM GRANULOCYTES NFR BLD AUTO: 1 %
LYMPHOCYTES # BLD AUTO: 1.9 X10E3/UL (ref 0.7–3.1)
LYMPHOCYTES NFR BLD AUTO: 22 %
MCH RBC QN AUTO: 28.3 PG (ref 26.6–33)
MCHC RBC AUTO-ENTMCNC: 32.3 G/DL (ref 31.5–35.7)
MCV RBC AUTO: 88 FL (ref 79–97)
MONOCYTES # BLD AUTO: 0.6 X10E3/UL (ref 0.1–0.9)
MONOCYTES NFR BLD AUTO: 7 %
NEUTROPHILS # BLD AUTO: 6.1 X10E3/UL (ref 1.4–7)
NEUTROPHILS NFR BLD AUTO: 70 %
PLATELET # BLD AUTO: 235 X10E3/UL (ref 150–450)
RBC # BLD AUTO: 4.42 X10E6/UL (ref 3.77–5.28)
RH BLD: POSITIVE
RPR SER QL: NON REACTIVE
RUBV IGG SERPL IA-ACNC: 16.1 INDEX
T4 FREE SERPL-MCNC: 1.21 NG/DL (ref 0.82–1.77)
TSH SERPL DL<=0.005 MIU/L-ACNC: 0.51 UIU/ML (ref 0.45–4.5)
WBC # BLD AUTO: 8.7 X10E3/UL (ref 3.4–10.8)

## 2022-07-14 ENCOUNTER — ROUTINE PRENATAL (OUTPATIENT)
Dept: OBGYN CLINIC | Age: 37
End: 2022-07-14
Payer: COMMERCIAL

## 2022-07-14 VITALS
OXYGEN SATURATION: 100 % | HEIGHT: 65 IN | DIASTOLIC BLOOD PRESSURE: 90 MMHG | SYSTOLIC BLOOD PRESSURE: 136 MMHG | BODY MASS INDEX: 31.83 KG/M2 | TEMPERATURE: 97.3 F | WEIGHT: 191.06 LBS | HEART RATE: 98 BPM | RESPIRATION RATE: 16 BRPM

## 2022-07-14 DIAGNOSIS — O10.919 CHRONIC HYPERTENSION AFFECTING PREGNANCY: ICD-10-CM

## 2022-07-14 DIAGNOSIS — Z34.02 ENCOUNTER FOR SUPERVISION OF NORMAL FIRST PREGNANCY IN SECOND TRIMESTER: Primary | ICD-10-CM

## 2022-07-14 DIAGNOSIS — Z3A.15 15 WEEKS GESTATION OF PREGNANCY: ICD-10-CM

## 2022-07-14 PROCEDURE — 0502F SUBSEQUENT PRENATAL CARE: CPT | Performed by: OBSTETRICS & GYNECOLOGY

## 2022-07-14 RX ORDER — FOLIC ACID/MULTIVIT,IRON,MINER 0.4MG-18MG
1 TABLET ORAL DAILY
Qty: 90 TABLET | Refills: 3 | Status: SHIPPED | OUTPATIENT
Start: 2022-07-14

## 2022-07-15 LAB
ALBUMIN SERPL-MCNC: 4 G/DL (ref 3.8–4.8)
ALBUMIN/GLOB SERPL: 1.4 {RATIO} (ref 1.2–2.2)
ALP SERPL-CCNC: 66 IU/L (ref 44–121)
ALT SERPL-CCNC: 26 IU/L (ref 0–32)
AST SERPL-CCNC: 26 IU/L (ref 0–40)
BILIRUB SERPL-MCNC: <0.2 MG/DL (ref 0–1.2)
BUN SERPL-MCNC: 7 MG/DL (ref 6–20)
BUN/CREAT SERPL: 9 (ref 9–23)
CALCIUM SERPL-MCNC: 9.9 MG/DL (ref 8.7–10.2)
CHLORIDE SERPL-SCNC: 102 MMOL/L (ref 96–106)
CO2 SERPL-SCNC: 22 MMOL/L (ref 20–29)
CREAT SERPL-MCNC: 0.76 MG/DL (ref 0.57–1)
CREAT UR-MCNC: 192.8 MG/DL
EGFR: 103 ML/MIN/1.73
GLOBULIN SER CALC-MCNC: 2.8 G/DL (ref 1.5–4.5)
GLUCOSE SERPL-MCNC: 92 MG/DL (ref 65–99)
POTASSIUM SERPL-SCNC: 4.2 MMOL/L (ref 3.5–5.2)
PROT SERPL-MCNC: 6.8 G/DL (ref 6–8.5)
PROT UR-MCNC: 10.7 MG/DL
PROT/CREAT UR: 55 MG/G CREAT (ref 0–200)
SODIUM SERPL-SCNC: 137 MMOL/L (ref 134–144)
URATE SERPL-MCNC: 3.9 MG/DL (ref 2.6–6.2)

## 2022-07-16 LAB
2ND TRIMESTER 4 SCREEN SERPL-IMP: NORMAL
AFP ADJ MOM SERPL: 0.7
AFP SERPL-MCNC: 20.2 NG/ML
AGE AT DELIVERY: 39 YR
FET TS 18 RISK FROM MAT AGE: NORMAL
FET TS 21 RISK FROM MAT AGE: 114
GA METHOD: NORMAL
GA: 15 WEEKS
HCG ADJ MOM SERPL: 1.05
HCG SERPL-ACNC: NORMAL MIU/ML
IDDM PATIENT QL: NO
INHIBIN A ADJ MOM SERPL: 0.83
INHIBIN A SERPL-MCNC: 120.49 PG/ML
MULTIPLE PREGNANCY: NO
NEURAL TUBE DEFECT RISK FETUS: NORMAL %
SERVICE CMNT-IMP: NORMAL
TS 18 RISK FETUS: NORMAL
TS 21 RISK FETUS: 381
U ESTRIOL ADJ MOM SERPL: 0.8
U ESTRIOL SERPL-MCNC: 0.56 NG/ML

## 2022-08-04 ENCOUNTER — ROUTINE PRENATAL (OUTPATIENT)
Dept: OBGYN CLINIC | Age: 37
End: 2022-08-04
Payer: COMMERCIAL

## 2022-08-04 VITALS
TEMPERATURE: 97.4 F | HEART RATE: 93 BPM | OXYGEN SATURATION: 100 % | WEIGHT: 196.44 LBS | DIASTOLIC BLOOD PRESSURE: 91 MMHG | HEIGHT: 65 IN | RESPIRATION RATE: 16 BRPM | BODY MASS INDEX: 32.73 KG/M2 | SYSTOLIC BLOOD PRESSURE: 143 MMHG

## 2022-08-04 DIAGNOSIS — Z34.02 ENCOUNTER FOR SUPERVISION OF NORMAL FIRST PREGNANCY IN SECOND TRIMESTER: Primary | ICD-10-CM

## 2022-08-04 DIAGNOSIS — Z3A.18 18 WEEKS GESTATION OF PREGNANCY: ICD-10-CM

## 2022-08-04 PROCEDURE — 0502F SUBSEQUENT PRENATAL CARE: CPT | Performed by: OBSTETRICS & GYNECOLOGY

## 2022-08-23 ENCOUNTER — ROUTINE PRENATAL (OUTPATIENT)
Dept: OBGYN CLINIC | Age: 37
End: 2022-08-23
Payer: COMMERCIAL

## 2022-08-23 VITALS
RESPIRATION RATE: 16 BRPM | OXYGEN SATURATION: 100 % | HEART RATE: 104 BPM | DIASTOLIC BLOOD PRESSURE: 86 MMHG | WEIGHT: 200.5 LBS | HEIGHT: 65 IN | BODY MASS INDEX: 33.41 KG/M2 | TEMPERATURE: 97.6 F | SYSTOLIC BLOOD PRESSURE: 132 MMHG

## 2022-08-23 DIAGNOSIS — Z34.02 ENCOUNTER FOR SUPERVISION OF NORMAL FIRST PREGNANCY IN SECOND TRIMESTER: Primary | ICD-10-CM

## 2022-08-23 DIAGNOSIS — Z3A.20 20 WEEKS GESTATION OF PREGNANCY: ICD-10-CM

## 2022-08-23 PROCEDURE — 0502F SUBSEQUENT PRENATAL CARE: CPT | Performed by: OBSTETRICS & GYNECOLOGY

## 2022-08-23 NOTE — PROGRESS NOTES
Patient is doing well, has no complaints. She being followed by MFM. Bedside ultrasound today is within normal limits. She is very interested in tubal ligation procedure. Patient oriented about [place].

## 2022-08-29 ENCOUNTER — TELEPHONE (OUTPATIENT)
Dept: OBGYN CLINIC | Age: 37
End: 2022-08-29

## 2022-08-29 NOTE — TELEPHONE ENCOUNTER
Patient contacted the office reports she has nasal congestion and runny nose. She reports it has been congested for awhile but it will also run. She reports she thinks that she has pregnancy rhinitis. Advised patient that she can take allergy medication such as claritin, zyrtec, Tavist.  Also advised that she can clean her nasal passage with saline nose drops  and use a cool mist humidifier.  She reports that she tested negative with home covid 19 test. Please review and advise if anything additional.

## 2022-09-15 ENCOUNTER — ROUTINE PRENATAL (OUTPATIENT)
Dept: OBGYN CLINIC | Age: 37
End: 2022-09-15
Payer: COMMERCIAL

## 2022-09-15 VITALS
DIASTOLIC BLOOD PRESSURE: 82 MMHG | RESPIRATION RATE: 16 BRPM | BODY MASS INDEX: 34.01 KG/M2 | TEMPERATURE: 98 F | HEART RATE: 103 BPM | WEIGHT: 204.13 LBS | SYSTOLIC BLOOD PRESSURE: 132 MMHG | HEIGHT: 65 IN | OXYGEN SATURATION: 100 %

## 2022-09-15 DIAGNOSIS — Z3A.24 24 WEEKS GESTATION OF PREGNANCY: ICD-10-CM

## 2022-09-15 DIAGNOSIS — Z34.02 ENCOUNTER FOR SUPERVISION OF NORMAL FIRST PREGNANCY IN SECOND TRIMESTER: Primary | ICD-10-CM

## 2022-09-15 PROCEDURE — 0502F SUBSEQUENT PRENATAL CARE: CPT | Performed by: OBSTETRICS & GYNECOLOGY

## 2022-09-19 ENCOUNTER — TELEPHONE (OUTPATIENT)
Dept: OBGYN CLINIC | Age: 37
End: 2022-09-19

## 2022-09-19 NOTE — TELEPHONE ENCOUNTER
Contacted patient back advised per Dr. Elizabeth Osborn she can increase her water intake and to use heating pad. Advised her not to leave heating pad on for no more that10-15 minutes at a time to prevent burning. She verbalized understanding.

## 2022-09-19 NOTE — TELEPHONE ENCOUNTER
Patient contacted the office reports that she is having some lower abdominal pain. Reports hurts only when she is walking and different positions. Reports when sitting still no issues. She reports that has taken tylenol and reliefs felt. She denies any bleeding, radiating of pain  from front to back of abdomen and no uterine tightening. She reports pain location only in one place and is relieved with sitting still. Advised that she may be experiencing some round ligament pain advised her not to make any sudden position changes and to support her abdomen. Advised her to call back if symptoms increase. She reports pain just started on yesterday. Is there any additional information you would like for this patient to have or do you want to see her. She is scheduled for 10/6/2022 and you just saw her last week. Please review and advise.

## 2022-10-06 ENCOUNTER — ROUTINE PRENATAL (OUTPATIENT)
Dept: OBGYN CLINIC | Age: 37
End: 2022-10-06
Payer: COMMERCIAL

## 2022-10-06 VITALS
OXYGEN SATURATION: 99 % | DIASTOLIC BLOOD PRESSURE: 78 MMHG | SYSTOLIC BLOOD PRESSURE: 122 MMHG | WEIGHT: 206.56 LBS | HEIGHT: 65 IN | BODY MASS INDEX: 34.41 KG/M2 | TEMPERATURE: 98 F | RESPIRATION RATE: 16 BRPM | HEART RATE: 72 BPM

## 2022-10-06 DIAGNOSIS — Z3A.27 27 WEEKS GESTATION OF PREGNANCY: ICD-10-CM

## 2022-10-06 DIAGNOSIS — Z34.03 ENCOUNTER FOR SUPERVISION OF NORMAL FIRST PREGNANCY IN THIRD TRIMESTER: Primary | ICD-10-CM

## 2022-10-06 PROCEDURE — 0502F SUBSEQUENT PRENATAL CARE: CPT | Performed by: OBSTETRICS & GYNECOLOGY

## 2022-10-06 NOTE — PROGRESS NOTES
Pending 1 hour GTT. Oriented about Tdap and flu vaccines. Patient has no complaints today. She is being followed by MFM.

## 2022-10-08 PROBLEM — O99.810 ABNORMAL O'SULLIVAN GLUCOSE CHALLENGE TEST, ANTEPARTUM: Status: ACTIVE | Noted: 2022-10-08

## 2022-10-08 LAB — GLUCOSE 1H P 50 G GLC PO SERPL-MCNC: 174 MG/DL (ref 65–139)

## 2022-10-08 NOTE — PROGRESS NOTES
Please call the patient regarding her abnormal result. Abnormal 1 hour GTT. Needs to complete 3-hour GTT. Please place order and advise patient to complete.   Thank you

## 2022-10-10 DIAGNOSIS — O10.919 CHRONIC HYPERTENSION AFFECTING PREGNANCY: ICD-10-CM

## 2022-10-12 DIAGNOSIS — O99.810 ABNORMAL GLUCOSE AFFECTING PREGNANCY: Primary | ICD-10-CM

## 2022-10-12 NOTE — PROGRESS NOTES
Spoke with patient advised that glucose was abnormal and provider is requesting that she complete a 3 hour glucose. Will place order. Patient to stop by and  this week.

## 2022-10-14 RX ORDER — LABETALOL 200 MG/1
TABLET, FILM COATED ORAL
Qty: 60 TABLET | Refills: 0 | Status: SHIPPED | OUTPATIENT
Start: 2022-10-14

## 2022-10-17 ENCOUNTER — TELEPHONE (OUTPATIENT)
Dept: OBGYN CLINIC | Age: 37
End: 2022-10-17

## 2022-10-17 NOTE — TELEPHONE ENCOUNTER
Patient came into the office to  lab slip for 3 hour glucose. We discussed her ob bag that was given to her at this time. Also gave her information on [facility] and advised that they will be contacting her.

## 2022-10-19 LAB
GLUCOSE 1H P 100 G GLC PO SERPL-MCNC: 169 MG/DL (ref 70–179)
GLUCOSE 2H P 100 G GLC PO SERPL-MCNC: 145 MG/DL (ref 70–154)
GLUCOSE 3H P 100 G GLC PO SERPL-MCNC: 85 MG/DL (ref 70–139)
GLUCOSE P FAST SERPL-MCNC: 97 MG/DL (ref 70–94)
NOTE:, 102047: ABNORMAL

## 2022-10-27 ENCOUNTER — ROUTINE PRENATAL (OUTPATIENT)
Dept: OBGYN CLINIC | Age: 37
End: 2022-10-27
Payer: COMMERCIAL

## 2022-10-27 VITALS
WEIGHT: 213.2 LBS | SYSTOLIC BLOOD PRESSURE: 129 MMHG | DIASTOLIC BLOOD PRESSURE: 74 MMHG | TEMPERATURE: 97.5 F | OXYGEN SATURATION: 99 % | RESPIRATION RATE: 20 BRPM | BODY MASS INDEX: 35.52 KG/M2 | HEART RATE: 109 BPM | HEIGHT: 65 IN

## 2022-10-27 DIAGNOSIS — Z3A.30 30 WEEKS GESTATION OF PREGNANCY: ICD-10-CM

## 2022-10-27 DIAGNOSIS — O09.93 HIGH-RISK PREGNANCY IN THIRD TRIMESTER: ICD-10-CM

## 2022-10-27 DIAGNOSIS — O10.919 CHRONIC HYPERTENSION AFFECTING PREGNANCY: Primary | ICD-10-CM

## 2022-10-27 DIAGNOSIS — Z34.93 PRENATAL CARE IN THIRD TRIMESTER: ICD-10-CM

## 2022-10-27 PROBLEM — K21.9 GASTROESOPHAGEAL REFLUX DISEASE WITHOUT ESOPHAGITIS: Status: ACTIVE | Noted: 2022-10-27

## 2022-10-27 PROBLEM — E78.00 PURE HYPERCHOLESTEROLEMIA: Status: ACTIVE | Noted: 2022-10-27

## 2022-10-27 PROBLEM — G43.009 MIGRAINE WITHOUT AURA: Status: ACTIVE | Noted: 2022-10-27

## 2022-10-27 PROBLEM — F41.9 ANXIETY: Status: ACTIVE | Noted: 2022-10-27

## 2022-10-27 PROBLEM — E04.9 GOITER: Status: ACTIVE | Noted: 2022-10-27

## 2022-10-27 PROBLEM — N83.201 CYST OF RIGHT OVARY: Status: ACTIVE | Noted: 2022-10-27

## 2022-10-27 PROCEDURE — 0502F SUBSEQUENT PRENATAL CARE: CPT | Performed by: OBSTETRICS & GYNECOLOGY

## 2022-10-27 RX ORDER — TETANUS TOXOID, REDUCED DIPHTHERIA TOXOID AND ACELLULAR PERTUSSIS VACCINE, ADSORBED 5; 2.5; 8; 8; 2.5 [IU]/.5ML; [IU]/.5ML; UG/.5ML; UG/.5ML; UG/.5ML
0.5 SUSPENSION INTRAMUSCULAR ONCE
Qty: 0.5 ML | Refills: 0 | Status: SHIPPED | OUTPATIENT
Start: 2022-10-27 | End: 2022-10-27

## 2022-10-27 RX ORDER — OMEPRAZOLE 40 MG/1
CAPSULE, DELAYED RELEASE ORAL
COMMUNITY

## 2022-10-27 NOTE — PROGRESS NOTES
3hr GTT wnl ( only 1 elevated value). Denies LOF/VB/CTXs. +FM. Tdap rx sent. CHTN- on Labetalol 200mg po BID.

## 2022-10-27 NOTE — PROGRESS NOTES
Chief Complaint   Patient presents with    Routine Prenatal Visit     Visit Vitals  /74 (BP 1 Location: Left upper arm, BP Patient Position: Sitting, BP Cuff Size: Adult)   Pulse (!) 109   Temp 97.5 °F (36.4 °C) (Temporal)   Resp 20   Ht 5' 5\" (1.651 m)   Wt 213 lb 3.2 oz (96.7 kg)   LMP 04/15/2022 (Approximate)   SpO2 99%   BMI 35.48 kg/m²

## 2022-11-09 ENCOUNTER — ROUTINE PRENATAL (OUTPATIENT)
Dept: OBGYN CLINIC | Age: 37
End: 2022-11-09
Payer: COMMERCIAL

## 2022-11-09 VITALS
SYSTOLIC BLOOD PRESSURE: 123 MMHG | BODY MASS INDEX: 35.74 KG/M2 | RESPIRATION RATE: 18 BRPM | HEART RATE: 121 BPM | WEIGHT: 214.5 LBS | OXYGEN SATURATION: 98 % | TEMPERATURE: 97.5 F | DIASTOLIC BLOOD PRESSURE: 76 MMHG | HEIGHT: 65 IN

## 2022-11-09 DIAGNOSIS — Z34.93 PRENATAL CARE IN THIRD TRIMESTER: ICD-10-CM

## 2022-11-09 DIAGNOSIS — O09.93 HIGH-RISK PREGNANCY IN THIRD TRIMESTER: ICD-10-CM

## 2022-11-09 DIAGNOSIS — Z3A.31 31 WEEKS GESTATION OF PREGNANCY: ICD-10-CM

## 2022-11-09 DIAGNOSIS — O10.919 CHRONIC HYPERTENSION AFFECTING PREGNANCY: Primary | ICD-10-CM

## 2022-11-09 PROCEDURE — 0502F SUBSEQUENT PRENATAL CARE: CPT | Performed by: OBSTETRICS & GYNECOLOGY

## 2022-11-09 NOTE — PROGRESS NOTES
Chief Complaint   Patient presents with    Routine Prenatal Visit     1. Have you been to the ER, urgent care clinic since your last visit? Hospitalized since your last visit? No    2. Have you seen or consulted any other health care providers outside of the [address] [place] [address] since your last visit? Include any pap smears or colon screening.  No    Visit Vitals  /76 (BP 1 Location: Right arm, BP Patient Position: Sitting, BP Cuff Size: Adult)   Pulse (!) 121   Temp 97.5 °F (36.4 °C) (Temporal)   Resp 18   Ht 5' 5\" (1.651 m)   Wt 214 lb 8 oz (97.3 kg)   LMP 04/15/2022 (Approximate)   SpO2 98%   BMI 35.69 kg/m²

## 2022-11-09 NOTE — PROGRESS NOTES
CHTN- Labetalol 200mg po BID. Bps at home 120-130s/70-80s. +FM. Denies LOF/VB/CTXs. S/p Tdap vaccine.

## 2022-11-22 ENCOUNTER — ROUTINE PRENATAL (OUTPATIENT)
Dept: OBGYN CLINIC | Age: 37
End: 2022-11-22
Payer: COMMERCIAL

## 2022-11-22 VITALS
RESPIRATION RATE: 20 BRPM | BODY MASS INDEX: 36.72 KG/M2 | SYSTOLIC BLOOD PRESSURE: 136 MMHG | DIASTOLIC BLOOD PRESSURE: 75 MMHG | TEMPERATURE: 97.5 F | OXYGEN SATURATION: 98 % | HEIGHT: 65 IN | HEART RATE: 122 BPM | WEIGHT: 220.4 LBS

## 2022-11-22 DIAGNOSIS — Z3A.33 33 WEEKS GESTATION OF PREGNANCY: ICD-10-CM

## 2022-11-22 DIAGNOSIS — Z34.93 PRENATAL CARE IN THIRD TRIMESTER: ICD-10-CM

## 2022-11-22 DIAGNOSIS — O10.919 CHRONIC HYPERTENSION AFFECTING PREGNANCY: Primary | ICD-10-CM

## 2022-11-22 DIAGNOSIS — O09.93 HIGH-RISK PREGNANCY IN THIRD TRIMESTER: ICD-10-CM

## 2022-11-22 NOTE — PROGRESS NOTES
CHTN- Labetalol 200mg po BID. 120s-130s/60s-70s. MFM appt today. Obtain report. +FM. Denies LOF/VB/CTXs.

## 2022-11-22 NOTE — PROGRESS NOTES
Chief Complaint   Patient presents with    Routine Prenatal Visit     1. Have you been to the ER, urgent care clinic since your last visit? Hospitalized since your last visit? No    2. Have you seen or consulted any other health care providers outside of the [address] [place] [address] since your last visit? Include any pap smears or colon screening.  No    Visit Vitals  /75 (BP 1 Location: Right arm, BP Patient Position: Sitting, BP Cuff Size: Adult)   Pulse (!) 122   Temp 97.5 °F (36.4 °C) (Temporal)   Resp 20   Ht 5' 5\" (1.651 m)   Wt 220 lb 6.4 oz (100 kg)   LMP 04/15/2022 (Approximate)   SpO2 98%   BMI 36.68 kg/m²

## 2022-12-09 ENCOUNTER — ROUTINE PRENATAL (OUTPATIENT)
Dept: OBGYN CLINIC | Age: 37
End: 2022-12-09
Payer: COMMERCIAL

## 2022-12-09 VITALS
BODY MASS INDEX: 37.85 KG/M2 | TEMPERATURE: 97.5 F | SYSTOLIC BLOOD PRESSURE: 139 MMHG | RESPIRATION RATE: 18 BRPM | HEART RATE: 106 BPM | OXYGEN SATURATION: 99 % | DIASTOLIC BLOOD PRESSURE: 80 MMHG | HEIGHT: 65 IN | WEIGHT: 227.19 LBS

## 2022-12-09 DIAGNOSIS — O10.919 CHRONIC HYPERTENSION AFFECTING PREGNANCY: Primary | ICD-10-CM

## 2022-12-09 DIAGNOSIS — Z34.93 PRENATAL CARE IN THIRD TRIMESTER: ICD-10-CM

## 2022-12-09 DIAGNOSIS — O09.93 HIGH-RISK PREGNANCY IN THIRD TRIMESTER: ICD-10-CM

## 2022-12-09 DIAGNOSIS — Z3A.36 36 WEEKS GESTATION OF PREGNANCY: ICD-10-CM

## 2022-12-09 PROCEDURE — 0502F SUBSEQUENT PRENATAL CARE: CPT | Performed by: OBSTETRICS & GYNECOLOGY

## 2022-12-09 NOTE — PROGRESS NOTES
Visit Vitals  /80 (BP 1 Location: Left upper arm, BP Patient Position: Sitting, BP Cuff Size: Adult)   Pulse (!) 106   Temp 97.5 °F (36.4 °C) (Temporal)   Resp 18   Ht 5' 5\" (1.651 m)   Wt 227 lb 3 oz (103.1 kg)   LMP 04/15/2022 (Approximate)   SpO2 99%   BMI 37.81 kg/m²       Chief Complaint   Patient presents with    Routine Prenatal Visit

## 2022-12-09 NOTE — PROGRESS NOTES
BPP 10 out of 10 on 2022 with MFM. EFW on 2022 at 33 weeks and 5 days, 5 pounds 10 ounces, 79th percentile; BPP 10/10. CHTN on Labetalol 200mg po BID, baby aspirin. Bps 120/70s. Discussed IOL for pt with CHTN, on meds, controlled, recommended by 39+6wga. She would like to go into labor on her own if possible so we discussed tentative date of 22, can finalize by 38wga. Reviewed risks of induction to include but not limited to risk of fetal heart rate abnormality requiring a  section and its associated risks including but not limited to injury to surrounding organs, bleeding, need for transfusion and its associate risks, hysterectomy, shoulder dystocia, infection, reoperation, prolonged hospital stay. +FM. Denies LOF/VB. Occasional ctxs. SVE closed. 3rd trim labs today.

## 2022-12-10 LAB
BASOPHILS # BLD AUTO: 0 X10E3/UL (ref 0–0.2)
BASOPHILS NFR BLD AUTO: 0 %
EOSINOPHIL # BLD AUTO: 0 X10E3/UL (ref 0–0.4)
EOSINOPHIL NFR BLD AUTO: 0 %
ERYTHROCYTE [DISTWIDTH] IN BLOOD BY AUTOMATED COUNT: 14.3 % (ref 11.7–15.4)
HCT VFR BLD AUTO: 31.3 % (ref 34–46.6)
HGB BLD-MCNC: 10.4 G/DL (ref 11.1–15.9)
HIV 1+2 AB+HIV1 P24 AG SERPL QL IA: NON REACTIVE
IMM GRANULOCYTES # BLD AUTO: 0.1 X10E3/UL (ref 0–0.1)
IMM GRANULOCYTES NFR BLD AUTO: 1 %
LYMPHOCYTES # BLD AUTO: 1.5 X10E3/UL (ref 0.7–3.1)
LYMPHOCYTES NFR BLD AUTO: 17 %
MCH RBC QN AUTO: 27.8 PG (ref 26.6–33)
MCHC RBC AUTO-ENTMCNC: 33.2 G/DL (ref 31.5–35.7)
MCV RBC AUTO: 84 FL (ref 79–97)
MONOCYTES # BLD AUTO: 0.8 X10E3/UL (ref 0.1–0.9)
MONOCYTES NFR BLD AUTO: 9 %
NEUTROPHILS # BLD AUTO: 6.6 X10E3/UL (ref 1.4–7)
NEUTROPHILS NFR BLD AUTO: 73 %
PLATELET # BLD AUTO: 233 X10E3/UL (ref 150–450)
RBC # BLD AUTO: 3.74 X10E6/UL (ref 3.77–5.28)
WBC # BLD AUTO: 9.1 X10E3/UL (ref 3.4–10.8)

## 2022-12-15 ENCOUNTER — ROUTINE PRENATAL (OUTPATIENT)
Dept: OBGYN CLINIC | Age: 37
End: 2022-12-15
Payer: COMMERCIAL

## 2022-12-15 VITALS
WEIGHT: 229.8 LBS | RESPIRATION RATE: 18 BRPM | OXYGEN SATURATION: 100 % | SYSTOLIC BLOOD PRESSURE: 148 MMHG | HEART RATE: 109 BPM | TEMPERATURE: 97.1 F | BODY MASS INDEX: 38.24 KG/M2 | DIASTOLIC BLOOD PRESSURE: 92 MMHG

## 2022-12-15 DIAGNOSIS — O09.523 ADVANCED MATERNAL AGE IN MULTIGRAVIDA, THIRD TRIMESTER: ICD-10-CM

## 2022-12-15 DIAGNOSIS — O09.93 HIGH-RISK PREGNANCY IN THIRD TRIMESTER: ICD-10-CM

## 2022-12-15 DIAGNOSIS — O10.919 CHRONIC HYPERTENSION AFFECTING PREGNANCY: Primary | ICD-10-CM

## 2022-12-15 DIAGNOSIS — O99.820 GROUP B STREPTOCOCCUS CARRIER STATE AFFECTING PREGNANCY: ICD-10-CM

## 2022-12-15 DIAGNOSIS — Z3A.37 37 WEEKS GESTATION OF PREGNANCY: ICD-10-CM

## 2022-12-15 PROBLEM — O09.511 PRIMIGRAVIDA OF ADVANCED MATERNAL AGE IN FIRST TRIMESTER: Status: RESOLVED | Noted: 2022-06-23 | Resolved: 2022-12-15

## 2022-12-15 PROCEDURE — 0502F SUBSEQUENT PRENATAL CARE: CPT | Performed by: OBSTETRICS & GYNECOLOGY

## 2022-12-15 NOTE — PROGRESS NOTES
Chief Complaint   Patient presents with    Routine Prenatal Visit     Lots of round ligament pain   Visit Vitals  BP (!) 154/76 (BP 1 Location: Left upper arm, BP Patient Position: Sitting, BP Cuff Size: Adult long)   Pulse (!) 109   Temp 97.1 °F (36.2 °C) (Temporal)   Resp 18   Wt 229 lb 12.8 oz (104.2 kg)   LMP 04/15/2022 (Approximate)   SpO2 100%   BMI 38.24 kg/m²

## 2022-12-15 NOTE — PROGRESS NOTES
States she had sausage tooday but took her am dose of Labetalol 200mg at scheduled time. Denies LOF/VB. Irreg cts. +FM. BPP 10/10 12/6/22. Reviewed risks of induction to include but not limited to risk of fetal heart rate abnormality requiring a  section and its associated risks including but not limited to injury to surrounding organs, bleeding, need for transfusion and its associate risks, hysterectomy, shoulder dystocia, infection, reoperation, prolonged hospital stay. Pre-eclampsia warning signs reviewed. Will schedule for IOL for 22 with cervical ripening the night before. GBS pos.

## 2022-12-15 NOTE — PROGRESS NOTES
Has round ligament pain. Irreg ctxs. Denies LOF/VB. GBS pos. Reviewed risks of induction to include but not limited to risk of fetal heart rate abnormality requiring a  section and its associated risks including but not limited to injury to surrounding organs, bleeding, need for transfusion and its associate risks, hysterectomy, shoulder dystocia, infection, reoperation, prolonged hospital stay. Will schedule for IOL for 22, cervical ripening the night before.

## 2022-12-17 PROBLEM — B95.1 POSITIVE GBS TEST: Status: ACTIVE | Noted: 2022-12-17

## 2022-12-21 ENCOUNTER — ROUTINE PRENATAL (OUTPATIENT)
Dept: OBGYN CLINIC | Age: 37
End: 2022-12-21
Payer: COMMERCIAL

## 2022-12-21 VITALS
WEIGHT: 227.1 LBS | HEIGHT: 65 IN | TEMPERATURE: 96.9 F | RESPIRATION RATE: 20 BRPM | OXYGEN SATURATION: 99 % | DIASTOLIC BLOOD PRESSURE: 79 MMHG | SYSTOLIC BLOOD PRESSURE: 147 MMHG | BODY MASS INDEX: 37.84 KG/M2 | HEART RATE: 95 BPM

## 2022-12-21 DIAGNOSIS — Z3A.37 37 WEEKS GESTATION OF PREGNANCY: ICD-10-CM

## 2022-12-21 DIAGNOSIS — O09.93 HIGH-RISK PREGNANCY IN THIRD TRIMESTER: ICD-10-CM

## 2022-12-21 DIAGNOSIS — O10.919 CHRONIC HYPERTENSION AFFECTING PREGNANCY: Primary | ICD-10-CM

## 2022-12-21 DIAGNOSIS — O99.820 GROUP B STREPTOCOCCUS CARRIER STATE AFFECTING PREGNANCY: ICD-10-CM

## 2022-12-21 PROCEDURE — 0502F SUBSEQUENT PRENATAL CARE: CPT | Performed by: OBSTETRICS & GYNECOLOGY

## 2022-12-21 NOTE — PROGRESS NOTES
CHTN on Labetalol 200mg po BID. Bps 130s/80s at home. Saw MFM yesterday, states EFW possibly 9 lbs. +FM. Denies LOF/VB. Irreg ctxs. GBS pos, will need abx in labor. Scheduled for IOL for 12/29, cervical ripening the night before. Discussed if EFW approaching 9 lbs, risk of shoulder dystocia or abnormal labor. Will be monitoring for during IOL.

## 2022-12-21 NOTE — PROGRESS NOTES
Chief Complaint   Patient presents with    Routine Prenatal Visit     1. Have you been to the ER, urgent care clinic since your last visit? Hospitalized since your last visit? No    2. Have you seen or consulted any other health care providers outside of the [address] [place] [address] since your last visit? Include any pap smears or colon screening.  No    Visit Vitals  BP (!) 147/79 (BP 1 Location: Right arm, BP Patient Position: Sitting, BP Cuff Size: Adult)   Pulse 95   Temp 96.9 °F (36.1 °C) (Temporal)   Resp 20   Ht 5' 5\" (1.651 m)   Wt 227 lb 1.6 oz (103 kg)   LMP 04/15/2022 (Approximate)   SpO2 99%   BMI 37.79 kg/m²

## 2022-12-28 ENCOUNTER — HOSPITAL ENCOUNTER (INPATIENT)
Age: 37
LOS: 4 days | Discharge: HOME | DRG: 540 | End: 2023-01-01
Attending: OBSTETRICS & GYNECOLOGY | Admitting: OBSTETRICS & GYNECOLOGY
Payer: COMMERCIAL

## 2022-12-28 ENCOUNTER — TELEPHONE (OUTPATIENT)
Dept: OBGYN CLINIC | Age: 37
End: 2022-12-28

## 2022-12-28 DIAGNOSIS — G89.18 POSTOPERATIVE PAIN: Primary | ICD-10-CM

## 2022-12-28 PROBLEM — Z34.90 PREGNANCY: Status: ACTIVE | Noted: 2022-12-28

## 2022-12-28 LAB
ABO + RH BLD: NORMAL
ALBUMIN SERPL-MCNC: 2.4 G/DL (ref 3.5–5)
ALBUMIN/GLOB SERPL: 0.6 {RATIO} (ref 1.1–2.2)
ALP SERPL-CCNC: 178 U/L (ref 45–117)
ALT SERPL-CCNC: 14 U/L (ref 12–78)
AMPHET UR QL SCN: NEGATIVE
ANION GAP SERPL CALC-SCNC: 9 MMOL/L (ref 5–15)
AST SERPL W P-5'-P-CCNC: 18 U/L (ref 15–37)
BARBITURATES UR QL SCN: NEGATIVE
BASOPHILS # BLD: 0 K/UL (ref 0–0.1)
BASOPHILS NFR BLD: 0 % (ref 0–1)
BENZODIAZ UR QL: NEGATIVE
BILIRUB SERPL-MCNC: 0.2 MG/DL (ref 0.2–1)
BLOOD GROUP ANTIBODIES SERPL: NEGATIVE
BUN SERPL-MCNC: 7 MG/DL (ref 6–20)
BUN/CREAT SERPL: 17 (ref 12–20)
CA-I BLD-MCNC: 9.1 MG/DL (ref 8.5–10.1)
CANNABINOIDS UR QL SCN: NEGATIVE
CHLORIDE SERPL-SCNC: 108 MMOL/L (ref 97–108)
CO2 SERPL-SCNC: 21 MMOL/L (ref 21–32)
COCAINE UR QL SCN: NEGATIVE
CREAT SERPL-MCNC: 0.42 MG/DL (ref 0.55–1.02)
DIFFERENTIAL METHOD BLD: ABNORMAL
DRUG SCRN COMMENT,DRGCM: NORMAL
EOSINOPHIL # BLD: 0.1 K/UL (ref 0–0.4)
EOSINOPHIL NFR BLD: 1 % (ref 0–7)
ERYTHROCYTE [DISTWIDTH] IN BLOOD BY AUTOMATED COUNT: 15.3 % (ref 11.5–14.5)
GLOBULIN SER CALC-MCNC: 4.2 G/DL (ref 2–4)
GLUCOSE SERPL-MCNC: 85 MG/DL (ref 65–100)
HCT VFR BLD AUTO: 35 % (ref 35–47)
HGB BLD-MCNC: 11.5 G/DL (ref 11.5–16)
IMM GRANULOCYTES # BLD AUTO: 0.1 K/UL (ref 0–0.04)
IMM GRANULOCYTES NFR BLD AUTO: 1 % (ref 0–0.5)
LYMPHOCYTES # BLD: 1.9 K/UL (ref 0.8–3.5)
LYMPHOCYTES NFR BLD: 18 % (ref 12–49)
MCH RBC QN AUTO: 27.9 PG (ref 26–34)
MCHC RBC AUTO-ENTMCNC: 32.9 G/DL (ref 30–36.5)
MCV RBC AUTO: 85 FL (ref 80–99)
METHADONE UR QL: NEGATIVE
MONOCYTES # BLD: 1.1 K/UL (ref 0–1)
MONOCYTES NFR BLD: 10 % (ref 5–13)
NEUTS SEG # BLD: 7.4 K/UL (ref 1.8–8)
NEUTS SEG NFR BLD: 70 % (ref 32–75)
NRBC # BLD: 0 K/UL (ref 0–0.01)
NRBC BLD-RTO: 0 PER 100 WBC
OPIATES UR QL: NEGATIVE
PCP UR QL: NEGATIVE
PLATELET # BLD AUTO: 211 K/UL (ref 150–400)
PMV BLD AUTO: 11.8 FL (ref 8.9–12.9)
POTASSIUM SERPL-SCNC: 3.7 MMOL/L (ref 3.5–5.1)
PROT SERPL-MCNC: 6.6 G/DL (ref 6.4–8.2)
RBC # BLD AUTO: 4.12 M/UL (ref 3.8–5.2)
SODIUM SERPL-SCNC: 138 MMOL/L (ref 136–145)
SPECIMEN EXP DATE BLD: NORMAL
WBC # BLD AUTO: 10.6 K/UL (ref 3.6–11)

## 2022-12-28 PROCEDURE — 85025 COMPLETE CBC W/AUTO DIFF WBC: CPT

## 2022-12-28 PROCEDURE — 80053 COMPREHEN METABOLIC PANEL: CPT

## 2022-12-28 PROCEDURE — 74011250637 HC RX REV CODE- 250/637: Performed by: OBSTETRICS & GYNECOLOGY

## 2022-12-28 PROCEDURE — 65410000002 HC RM PRIVATE OB

## 2022-12-28 PROCEDURE — 86901 BLOOD TYPING SEROLOGIC RH(D): CPT

## 2022-12-28 PROCEDURE — 74011250636 HC RX REV CODE- 250/636: Performed by: OBSTETRICS & GYNECOLOGY

## 2022-12-28 PROCEDURE — 36415 COLL VENOUS BLD VENIPUNCTURE: CPT

## 2022-12-28 PROCEDURE — 80307 DRUG TEST PRSMV CHEM ANLYZR: CPT

## 2022-12-28 RX ORDER — LABETALOL 200 MG/1
200 TABLET, FILM COATED ORAL EVERY 12 HOURS
Status: DISCONTINUED | OUTPATIENT
Start: 2022-12-28 | End: 2023-01-01 | Stop reason: HOSPADM

## 2022-12-28 RX ORDER — CALCIUM CARBONATE 200(500)MG
400 TABLET,CHEWABLE ORAL
Status: DISCONTINUED | OUTPATIENT
Start: 2022-12-29 | End: 2022-12-29

## 2022-12-28 RX ORDER — OXYTOCIN/RINGER'S LACTATE 30/500 ML
87.3 PLASTIC BAG, INJECTION (ML) INTRAVENOUS AS NEEDED
Status: DISCONTINUED | OUTPATIENT
Start: 2022-12-28 | End: 2022-12-30

## 2022-12-28 RX ORDER — SODIUM CHLORIDE, SODIUM LACTATE, POTASSIUM CHLORIDE, CALCIUM CHLORIDE 600; 310; 30; 20 MG/100ML; MG/100ML; MG/100ML; MG/100ML
125 INJECTION, SOLUTION INTRAVENOUS CONTINUOUS
Status: DISCONTINUED | OUTPATIENT
Start: 2022-12-29 | End: 2022-12-30

## 2022-12-28 RX ORDER — OXYTOCIN/RINGER'S LACTATE 30/500 ML
10 PLASTIC BAG, INJECTION (ML) INTRAVENOUS AS NEEDED
Status: DISCONTINUED | OUTPATIENT
Start: 2022-12-28 | End: 2022-12-30

## 2022-12-28 RX ORDER — NALOXONE HYDROCHLORIDE 0.4 MG/ML
0.4 INJECTION, SOLUTION INTRAMUSCULAR; INTRAVENOUS; SUBCUTANEOUS AS NEEDED
Status: DISCONTINUED | OUTPATIENT
Start: 2022-12-28 | End: 2022-12-30

## 2022-12-28 RX ORDER — BUTORPHANOL TARTRATE 1 MG/ML
1 INJECTION INTRAMUSCULAR; INTRAVENOUS
Status: DISCONTINUED | OUTPATIENT
Start: 2022-12-28 | End: 2022-12-30

## 2022-12-28 RX ORDER — SODIUM CHLORIDE 0.9 % (FLUSH) 0.9 %
5-40 SYRINGE (ML) INJECTION AS NEEDED
Status: DISCONTINUED | OUTPATIENT
Start: 2022-12-28 | End: 2022-12-30

## 2022-12-28 RX ORDER — SODIUM CHLORIDE 0.9 % (FLUSH) 0.9 %
5-40 SYRINGE (ML) INJECTION EVERY 8 HOURS
Status: DISCONTINUED | OUTPATIENT
Start: 2022-12-28 | End: 2022-12-30

## 2022-12-28 RX ORDER — ZOLPIDEM TARTRATE 5 MG/1
5 TABLET ORAL
Status: DISCONTINUED | OUTPATIENT
Start: 2022-12-28 | End: 2022-12-30

## 2022-12-28 RX ADMIN — LABETALOL HYDROCHLORIDE 200 MG: 200 TABLET, FILM COATED ORAL at 21:00

## 2022-12-28 RX ADMIN — SODIUM CHLORIDE, POTASSIUM CHLORIDE, SODIUM LACTATE AND CALCIUM CHLORIDE 1000 ML: 600; 310; 30; 20 INJECTION, SOLUTION INTRAVENOUS at 16:28

## 2022-12-28 RX ADMIN — Medication 25 MCG: at 20:00

## 2022-12-28 NOTE — H&P
History & Physical    Name: Shellie Andrews MRN: [id number]  SSN: [id number]    YOB: 1985  Age: 42 y.o. Sex: female        Subjective:     Estimated Date of Delivery: 23  OB History          1    Para        Term                AB        Living             SAB        IAB        Ectopic        Molar        Multiple        Live Births                    Ms. Edison Severin is admitted with pregnancy at 38w6d for Cytotec prior to induction of labor on 2022. Prenatal course was complicated by hypertension. Please see prenatal records for details. Past Medical History:   Diagnosis Date    Anxiety     GERD (gastroesophageal reflux disease)     Headache     Hypercholesterolemia     Hypertension     Obesity     Phobia     Psychiatric disorder      Past Surgical History:   Procedure Laterality Date    HX TONSIL AND ADENOIDECTOMY      [place] CRYOCAUTERY OF CERVIX       Social History     Occupational History    Not on file   Tobacco Use    Smoking status: Former    Smokeless tobacco: Never   Vaping Use    Vaping Use: Never used   Substance and Sexual Activity    Alcohol use: Not Currently    Drug use: Never    Sexual activity: Yes     Birth control/protection: None     Comment: Stopped birth control 2 months ago     Family History   Problem Relation Age of Onset    Diabetes Maternal Grandmother     Hypertension Maternal Grandmother     Diabetes Paternal Grandmother     Hypertension Paternal Grandmother        No Known Allergies  Prior to Admission medications    Medication Sig Start Date End Date Taking? Authorizing Provider   labetaloL (NORMODYNE) 200 mg tablet Take 1 tablet by mouth twice daily 22  Yes Nora Kan MD   omeprazole (PRILOSEC) 40 mg capsule 1 capsule   Yes Provider, Historical   prenatal vitamin (Prenatal Multivitamins) 28 mg iron- 800 mcg tab tablet Take 1 Tablet by mouth daily.  22  Yes Rosina العلي MD   aspirin delayed-release 81 mg tablet Take 1 Tablet by mouth daily. Patient not taking: No sig reported 6/23/22   Fortino Castillo MD        Review of Systems: A comprehensive review of systems was negative except for that written in the HPI. Objective:     Vitals:  Vitals:    12/28/22 1615 12/28/22 1630 12/28/22 1645 12/28/22 1700   BP: (!) 141/99 137/76 (!) 136/90 131/80   Pulse: 99 100 96 96   Resp:    20   Temp:       SpO2:    100%   Weight:       Height:            Physical Exam:  Patient is well-developed well-nourished female no apparent distress  She is alert oriented x3  Heart is with regular rate and rhythm  Lungs are clear  Abdomen is gravid  Extremities are without clubbing cyanosis or edema  Cervix is closed  Membranes:  Intact  Fetal Heart Rate: Fetal heart tones are in 140s and reactive    Prenatal Labs:   Please see prenatal record    Assessment/Plan:     Plan: Admit for Cytotec prior to induction of labor on 12/29/2022. Group B Strep was positive.     Signed By:  Flavia Campos MD     December 28, 2022

## 2022-12-28 NOTE — PROGRESS NOTES
1600-Pt arrives ambulatory for IOL  1835-MD at bedside performs SVE   1900-Report to oncoming nurse.

## 2022-12-28 NOTE — TELEPHONE ENCOUNTER
Patient contacted the office requesting time in which she should report for her induction.   Spoke with Labor and delivery who advised that patient should report between 3 and 3:30PM.

## 2022-12-29 PROBLEM — Z3A.39 39 WEEKS GESTATION OF PREGNANCY: Status: ACTIVE | Noted: 2022-12-28

## 2022-12-29 PROBLEM — O36.63X0 EXCESSIVE FETAL GROWTH AFFECTING MANAGEMENT OF PREGNANCY IN THIRD TRIMESTER: Status: ACTIVE | Noted: 2022-12-29

## 2022-12-29 PROCEDURE — 65410000002 HC RM PRIVATE OB

## 2022-12-29 PROCEDURE — 74011000258 HC RX REV CODE- 258: Performed by: OBSTETRICS & GYNECOLOGY

## 2022-12-29 PROCEDURE — 74011250636 HC RX REV CODE- 250/636: Performed by: OBSTETRICS & GYNECOLOGY

## 2022-12-29 PROCEDURE — 90000 NO LOS: CPT | Performed by: OBSTETRICS & GYNECOLOGY

## 2022-12-29 PROCEDURE — 74011250637 HC RX REV CODE- 250/637: Performed by: OBSTETRICS & GYNECOLOGY

## 2022-12-29 RX ORDER — CALCIUM CARBONATE 200(500)MG
400 TABLET,CHEWABLE ORAL
Status: DISCONTINUED | OUTPATIENT
Start: 2022-12-29 | End: 2023-01-01 | Stop reason: HOSPADM

## 2022-12-29 RX ORDER — PENICILLIN G POTASSIUM 20000000 [IU]/1
2.5 INJECTION, POWDER, FOR SOLUTION INTRAVENOUS EVERY 4 HOURS
Status: DISCONTINUED | OUTPATIENT
Start: 2022-12-29 | End: 2022-12-29 | Stop reason: SDUPTHER

## 2022-12-29 RX ORDER — DOCUSATE SODIUM 100 MG/1
100 CAPSULE, LIQUID FILLED ORAL DAILY
Status: DISCONTINUED | OUTPATIENT
Start: 2022-12-29 | End: 2022-12-30

## 2022-12-29 RX ADMIN — Medication 25 MCG: at 00:02

## 2022-12-29 RX ADMIN — Medication 25 MCG: at 20:56

## 2022-12-29 RX ADMIN — Medication 25 MCG: at 04:17

## 2022-12-29 RX ADMIN — Medication 25 MCG: at 16:23

## 2022-12-29 RX ADMIN — LABETALOL HYDROCHLORIDE 200 MG: 200 TABLET, FILM COATED ORAL at 09:26

## 2022-12-29 RX ADMIN — SODIUM CHLORIDE 5 MILLION UNITS: 900 INJECTION INTRAVENOUS at 04:17

## 2022-12-29 RX ADMIN — Medication 2.5 MILLION UNITS: at 21:04

## 2022-12-29 RX ADMIN — Medication 2.5 MILLION UNITS: at 09:26

## 2022-12-29 RX ADMIN — LABETALOL HYDROCHLORIDE 200 MG: 200 TABLET, FILM COATED ORAL at 21:00

## 2022-12-29 RX ADMIN — Medication 25 MCG: at 12:30

## 2022-12-29 RX ADMIN — Medication 25 MCG: at 07:57

## 2022-12-29 RX ADMIN — SODIUM CHLORIDE, POTASSIUM CHLORIDE, SODIUM LACTATE AND CALCIUM CHLORIDE 125 ML/HR: 600; 310; 30; 20 INJECTION, SOLUTION INTRAVENOUS at 04:25

## 2022-12-29 NOTE — PROGRESS NOTES
Spiritual Care Assessment/Progress Note  [facility]      NAME: Su Butts      MRN: [id number]  AGE: 42 y.o.  SEX: female  Adventist Affiliation: Nondenominational   Language: English     12/29/2022     Total Time (in minutes): 6     Spiritual Assessment begun in [facility] LABOR & DELIVERY through conversation with:         [x]Patient        [] Family    [] Friend(s)        Reason for Consult: Request by patient     Spiritual beliefs: (Please include comment if needed)     [x] Identifies with a lucio tradition: Nondenominational        [] Supported by a lucio community:            [] Claims no spiritual orientation:           [] Seeking spiritual identity:                [] Adheres to an individual form of spirituality:           [] Not able to assess:                           Identified resources for coping:      [x] Prayer                               [] Music                  [] Guided Imagery     [x] Family/friends                 [] Pet visits     [] Devotional reading                         [] Unknown     [] Other:                                               Interventions offered during this visit: (See comments for more details)    Patient Interventions: Initial visit, Adventist beliefs/image of God discussed, Affirmation of emotions/emotional suffering, Normalization of emotional/spiritual concerns           Plan of Care:     [] Support spiritual and/or cultural needs    [] Support AMD and/or advance care planning process      [] Support grieving process   [] Coordinate Rites and/or Rituals    [] Coordination with community clergy   [] No spiritual needs identified at this time   [] Detailed Plan of Care below (See Comments)  [] Make referral to Music Therapy  [] Make referral to Pet Therapy     [] Make referral to Addiction services  [] Make referral to [facility]  [] Make referral to Spiritual Care Partner  [] No future visits requested        [x] Contact Spiritual Care for further referrals Comments: I visited patient this morning due to in-basket spiritual care request. I did a brief spiritual assessment with patient, for whom lucio is a source of comfort through her life. We discussed the anju pt feels and the tinge of sorrow with those who are not living that do not get to see pt's child. I affirmed the anju and sorrow of the experience, providing prayer as asked by patient, and informed pt of  availability.     Please [place]  in order to get in touch with  for any Spiritual Care Needs   [phone]    Signed by: Chaplain Catalina

## 2022-12-29 NOTE — PROGRESS NOTES
L&D Triage      Subjective:     Patient has no complaints. +Contractions. Denies LOF/VB. +FM. EFW on 12/20/22 at 37+5wga: 9lbs 4oz, > 99%. Past Medical History:   Diagnosis Date    Anxiety     GERD (gastroesophageal reflux disease)     Headache     Hypercholesterolemia     Hypertension     Obesity     Phobia     Psychiatric disorder       Past Surgical History:   Procedure Laterality Date    HX TONSIL AND ADENOIDECTOMY      [place] CRYOCAUTERY OF CERVIX          Review of Systems  Review of Systems   Constitutional:  Negative for chills and fever. Gastrointestinal:  Negative for nausea and vomiting. Objective:     Patient Vitals for the past 8 hrs:   BP Temp Pulse Resp SpO2   12/29/22 0910 135/81 -- -- -- --   12/29/22 0755 137/89 98 °F (36.7 °C) 93 16 100 %     No intake/output data recorded. No intake/output data recorded.     Current Facility-Administered Medications   Medication Dose Route Frequency    calcium carbonate (TUMS) chewable tablet 400 mg [elemental]  400 mg Oral TID PRN    docusate sodium (COLACE) capsule 100 mg  100 mg Oral DAILY    lactated Ringers infusion  125 mL/hr IntraVENous CONTINUOUS    lactated ringers bolus infusion 500 mL  500 mL IntraVENous PRN    sodium chloride (NS) flush 5-40 mL  5-40 mL IntraVENous Q8H    sodium chloride (NS) flush 5-40 mL  5-40 mL IntraVENous PRN    oxytocin (PITOCIN) 10 unit bolus from bag  10 Units IntraVENous PRN    And    oxytocin (PITOCIN) 30 units in 500 mL infusion  87.3 jan-units/min IntraVENous PRN    naloxone (NARCAN) injection 0.4 mg  0.4 mg IntraVENous PRN    penicillin G pot (PFIZERPEN) 2.5 Million Units in 50 ml 0.9% NaCl  2.5 Million Units IntraVENous Q4H    zolpidem (AMBIEN) tablet 5 mg  5 mg Oral QHS PRN    butorphanol (STADOL) injection 1 mg  1 mg IntraVENous Q3H PRN    miSOPROStol (CYTOTEC) tablet (prepacked) 25 mcg  25 mcg Buccal Q4H    labetaloL (NORMODYNE) tablet 200 mg  200 mg Oral Q12H        Physical Exam:   Physical Exam  Constitutional:       General: She is awake. She is not in acute distress. Appearance: Normal appearance. HENT:      Head: Normocephalic and atraumatic. Eyes:      Extraocular Movements: Extraocular movements intact. Abdominal:      Comments: Gravid, NT   Musculoskeletal:      Cervical back: Normal range of motion. Neurological:      Mental Status: She is alert and oriented to person, place, and time. Skin:     General: Skin is warm and dry. Psychiatric:         Mood and Affect: Mood normal.         Behavior: Behavior normal. Behavior is cooperative. FHT: 140/mod/+acc/no decels  [place]: ctxs q 2-4 mins  EFW: 9+ lbs  SVE: closed/th/high          Data Review   Recent Results (from the past 24 hour(s))   CBC WITH AUTOMATED DIFF    Collection Time: 12/28/22  4:15 PM   Result Value Ref Range    WBC 10.6 3.6 - 11.0 K/uL    RBC 4.12 3.80 - 5.20 M/uL    HGB 11.5 11.5 - 16.0 g/dL    HCT 35.0 35.0 - 47.0 %    MCV 85.0 80.0 - 99.0 FL    MCH 27.9 26.0 - 34.0 PG    MCHC 32.9 30.0 - 36.5 g/dL    RDW 15.3 (H) 11.5 - 14.5 %    PLATELET 188 368 - 591 K/uL    MPV 11.8 8.9 - 12.9 FL    NRBC 0.0 0.0  WBC    ABSOLUTE NRBC 0.00 0.00 - 0.01 K/uL    NEUTROPHILS 70 32 - 75 %    LYMPHOCYTES 18 12 - 49 %    MONOCYTES 10 5 - 13 %    EOSINOPHILS 1 0 - 7 %    BASOPHILS 0 0 - 1 %    IMMATURE GRANULOCYTES 1 (H) 0 - 0.5 %    ABS. NEUTROPHILS 7.4 1.8 - 8.0 K/UL    ABS. LYMPHOCYTES 1.9 0.8 - 3.5 K/UL    ABS. MONOCYTES 1.1 (H) 0.0 - 1.0 K/UL    ABS. EOSINOPHILS 0.1 0.0 - 0.4 K/UL    ABS. BASOPHILS 0.0 0.0 - 0.1 K/UL    ABS. IMM.  GRANS. 0.1 (H) 0.00 - 0.04 K/UL    DF AUTOMATED     TYPE & SCREEN    Collection Time: 12/28/22  4:15 PM   Result Value Ref Range    Crossmatch Expiration 12/31/2022,2359     ABO/Rh(D) [facility] Positive     Antibody screen Negative    DRUG SCREEN, URINE    Collection Time: 12/28/22  4:15 PM   Result Value Ref Range    AMPHETAMINES Negative Negative      BARBITURATES Negative Negative BENZODIAZEPINES Negative Negative      COCAINE Negative Negative      METHADONE Negative Negative      OPIATES Negative Negative      PCP(PHENCYCLIDINE) Negative Negative      THC (TH-CANNABINOL) Negative Negative      Drug screen comment        This test is a screen for drugs of abuse in a medical setting only (i.e., they are unconfirmed results and as such must not be used for non-medical purposes, e.g.,employment testing, legal testing). Due to its inherent nature, false positive (FP) and false negative (FN) results may be obtained. Therefore, if necessary for medical care, recommend confirmation of positive findings by GC/MS. METABOLIC PANEL, COMPREHENSIVE    Collection Time: 22  4:15 PM   Result Value Ref Range    Sodium 138 136 - 145 mmol/L    Potassium 3.7 3.5 - 5.1 mmol/L    Chloride 108 97 - 108 mmol/L    CO2 21 21 - 32 mmol/L    Anion gap 9 5 - 15 mmol/L    Glucose 85 65 - 100 mg/dL    BUN 7 6 - 20 mg/dL    Creatinine 0.42 (L) 0.55 - 1.02 mg/dL    BUN/Creatinine ratio 17 12 - 20      eGFR >60 >60 ml/min/1.73m2    Calcium 9.1 8.5 - 10.1 mg/dL    Bilirubin, total 0.2 0.2 - 1.0 mg/dL    AST (SGOT) 18 15 - 37 U/L    ALT (SGPT) 14 12 - 78 U/L    Alk. phosphatase 178 (H) 45 - 117 U/L    Protein, total 6.6 6.4 - 8.2 g/dL    Albumin 2.4 (L) 3.5 - 5.0 g/dL    Globulin 4.2 (H) 2.0 - 4.0 g/dL    A-G Ratio 0.6 (L) 1.1 - 2.2             Assessment:     Principal Problem:    Chronic hypertension affecting pregnancy (2022)    Active Problems:    Group B Streptococcus carrier state affecting pregnancy (12/15/2022)      39 weeks gestation of pregnancy (2022)      Excessive fetal growth affecting management of pregnancy in third trimester (2022)      Plan:   Discussed cervical ripening/induction of labor process.  Discussed risks of it to include but not be limited to risks of fetal heart rate abnormalities requiring emergency  section and its associated risks including risk of damage to uterus and surrounding organs including bowel, bladder, nerves, blood vessels, risk of bleeding, need for blood transfusion and its associated risks, infection, reoperation, prolonged hospital stay; shoulder dystocia with possible sequelae of permanent nerve damage, and rarely hysterectomy. She understood and desired to proceed. Discussed possible need for operative vaginal delivery with vacuum or forceps and its possible risk of injury to the baby's face, bleeding on top of the scalp or brain. She can receive next dose of buccal cytotec at 12 pm (this will be dose 5). Understands if IOL fails or fetal intolerance noted, we would be discussing PCS. All questions answered. PCN in active labor.

## 2022-12-29 NOTE — PROGRESS NOTES
0715-Report from [facility]   0735-MD called to unit advised to give 8am cytotec   1030-MD to bedside SVE performed see flow  1623-MD to bedside to perform SVE see flow. 1910-Bedside shift report to oncoming nurse.

## 2022-12-30 ENCOUNTER — ANESTHESIA EVENT (OUTPATIENT)
Dept: LABOR AND DELIVERY | Age: 37
End: 2022-12-30
Payer: COMMERCIAL

## 2022-12-30 ENCOUNTER — ANESTHESIA (OUTPATIENT)
Dept: LABOR AND DELIVERY | Age: 37
End: 2022-12-30
Payer: COMMERCIAL

## 2022-12-30 PROCEDURE — 74011000258 HC RX REV CODE- 258: Performed by: NURSE ANESTHETIST, CERTIFIED REGISTERED

## 2022-12-30 PROCEDURE — 74011250636 HC RX REV CODE- 250/636: Performed by: OBSTETRICS & GYNECOLOGY

## 2022-12-30 PROCEDURE — 59510 CESAREAN DELIVERY: CPT | Performed by: OBSTETRICS & GYNECOLOGY

## 2022-12-30 PROCEDURE — 76060000078 HC EPIDURAL ANESTHESIA: Performed by: OBSTETRICS & GYNECOLOGY

## 2022-12-30 PROCEDURE — 76060000033 HC ANESTHESIA 1 TO 1.5 HR: Performed by: OBSTETRICS & GYNECOLOGY

## 2022-12-30 PROCEDURE — 76010000392 HC C SECN EA ADDL 0.5 HR: Performed by: OBSTETRICS & GYNECOLOGY

## 2022-12-30 PROCEDURE — 74011250637 HC RX REV CODE- 250/637: Performed by: OBSTETRICS & GYNECOLOGY

## 2022-12-30 PROCEDURE — 74011000250 HC RX REV CODE- 250: Performed by: ANESTHESIOLOGY

## 2022-12-30 PROCEDURE — 74011250636 HC RX REV CODE- 250/636: Performed by: NURSE ANESTHETIST, CERTIFIED REGISTERED

## 2022-12-30 PROCEDURE — 74011000250 HC RX REV CODE- 250: Performed by: OBSTETRICS & GYNECOLOGY

## 2022-12-30 PROCEDURE — 65410000002 HC RM PRIVATE OB

## 2022-12-30 PROCEDURE — 74011250636 HC RX REV CODE- 250/636: Performed by: ANESTHESIOLOGY

## 2022-12-30 PROCEDURE — 76010000391 HC C SECN FIRST 1 HR: Performed by: OBSTETRICS & GYNECOLOGY

## 2022-12-30 PROCEDURE — 75410000003 HC RECOV DEL/VAG/CSECN EA 0.5 HR

## 2022-12-30 PROCEDURE — 75410000002 HC LABOR FEE PER 1 HR

## 2022-12-30 RX ORDER — OXYTOCIN/RINGER'S LACTATE 30/500 ML
10 PLASTIC BAG, INJECTION (ML) INTRAVENOUS AS NEEDED
Status: DISCONTINUED | OUTPATIENT
Start: 2022-12-30 | End: 2023-01-01 | Stop reason: HOSPADM

## 2022-12-30 RX ORDER — BUPIVACAINE HYDROCHLORIDE 7.5 MG/ML
INJECTION, SOLUTION EPIDURAL; RETROBULBAR
Status: SHIPPED | OUTPATIENT
Start: 2022-12-30 | End: 2022-12-30

## 2022-12-30 RX ORDER — FAMOTIDINE 10 MG/ML
INJECTION INTRAVENOUS AS NEEDED
Status: DISCONTINUED | OUTPATIENT
Start: 2022-12-30 | End: 2022-12-30 | Stop reason: HOSPADM

## 2022-12-30 RX ORDER — BUPIVACAINE HYDROCHLORIDE 2.5 MG/ML
INJECTION, SOLUTION EPIDURAL; INFILTRATION; INTRACAUDAL
Status: DISCONTINUED
Start: 2022-12-30 | End: 2022-12-30

## 2022-12-30 RX ORDER — MORPHINE SULFATE 0.5 MG/ML
INJECTION, SOLUTION EPIDURAL; INTRATHECAL; INTRAVENOUS
Status: SHIPPED | OUTPATIENT
Start: 2022-12-30 | End: 2022-12-30

## 2022-12-30 RX ORDER — OXYTOCIN/RINGER'S LACTATE 20/1000 ML
PLASTIC BAG, INJECTION (ML) INTRAVENOUS
Status: DISCONTINUED | OUTPATIENT
Start: 2022-12-30 | End: 2022-12-30 | Stop reason: HOSPADM

## 2022-12-30 RX ORDER — SODIUM CHLORIDE 0.9 % (FLUSH) 0.9 %
5-40 SYRINGE (ML) INJECTION EVERY 8 HOURS
Status: DISCONTINUED | OUTPATIENT
Start: 2022-12-30 | End: 2022-12-30

## 2022-12-30 RX ORDER — OXYTOCIN/RINGER'S LACTATE 30/500 ML
87.3 PLASTIC BAG, INJECTION (ML) INTRAVENOUS AS NEEDED
Status: DISCONTINUED | OUTPATIENT
Start: 2022-12-30 | End: 2022-12-30

## 2022-12-30 RX ORDER — NALOXONE HYDROCHLORIDE 0.4 MG/ML
0.4 INJECTION, SOLUTION INTRAMUSCULAR; INTRAVENOUS; SUBCUTANEOUS AS NEEDED
Status: DISCONTINUED | OUTPATIENT
Start: 2022-12-30 | End: 2023-01-01 | Stop reason: HOSPADM

## 2022-12-30 RX ORDER — ONDANSETRON 2 MG/ML
4 INJECTION INTRAMUSCULAR; INTRAVENOUS
Status: DISCONTINUED | OUTPATIENT
Start: 2022-12-30 | End: 2023-01-01 | Stop reason: HOSPADM

## 2022-12-30 RX ORDER — SODIUM CHLORIDE 0.9 % (FLUSH) 0.9 %
5-40 SYRINGE (ML) INJECTION AS NEEDED
Status: DISCONTINUED | OUTPATIENT
Start: 2022-12-30 | End: 2023-01-01 | Stop reason: HOSPADM

## 2022-12-30 RX ORDER — IBUPROFEN 800 MG/1
800 TABLET ORAL EVERY 8 HOURS
Status: DISCONTINUED | OUTPATIENT
Start: 2022-12-30 | End: 2023-01-01 | Stop reason: HOSPADM

## 2022-12-30 RX ORDER — SODIUM CHLORIDE, SODIUM LACTATE, POTASSIUM CHLORIDE, CALCIUM CHLORIDE 600; 310; 30; 20 MG/100ML; MG/100ML; MG/100ML; MG/100ML
1000 INJECTION, SOLUTION INTRAVENOUS CONTINUOUS
Status: DISCONTINUED | OUTPATIENT
Start: 2022-12-30 | End: 2022-12-30

## 2022-12-30 RX ORDER — ZOLPIDEM TARTRATE 5 MG/1
5 TABLET ORAL
Status: DISCONTINUED | OUTPATIENT
Start: 2022-12-30 | End: 2023-01-01 | Stop reason: HOSPADM

## 2022-12-30 RX ORDER — OXYTOCIN/RINGER'S LACTATE 30/500 ML
87.3 PLASTIC BAG, INJECTION (ML) INTRAVENOUS AS NEEDED
Status: COMPLETED | OUTPATIENT
Start: 2022-12-30 | End: 2022-12-30

## 2022-12-30 RX ORDER — OXYTOCIN/RINGER'S LACTATE 30/500 ML
10 PLASTIC BAG, INJECTION (ML) INTRAVENOUS AS NEEDED
Status: DISCONTINUED | OUTPATIENT
Start: 2022-12-30 | End: 2022-12-30

## 2022-12-30 RX ORDER — DEXAMETHASONE SODIUM PHOSPHATE 4 MG/ML
INJECTION, SOLUTION INTRA-ARTICULAR; INTRALESIONAL; INTRAMUSCULAR; INTRAVENOUS; SOFT TISSUE AS NEEDED
Status: DISCONTINUED | OUTPATIENT
Start: 2022-12-30 | End: 2022-12-30 | Stop reason: HOSPADM

## 2022-12-30 RX ORDER — SODIUM CHLORIDE, SODIUM LACTATE, POTASSIUM CHLORIDE, CALCIUM CHLORIDE 600; 310; 30; 20 MG/100ML; MG/100ML; MG/100ML; MG/100ML
INJECTION, SOLUTION INTRAVENOUS
Status: DISCONTINUED | OUTPATIENT
Start: 2022-12-30 | End: 2022-12-30 | Stop reason: HOSPADM

## 2022-12-30 RX ORDER — ONDANSETRON 2 MG/ML
INJECTION INTRAMUSCULAR; INTRAVENOUS AS NEEDED
Status: DISCONTINUED | OUTPATIENT
Start: 2022-12-30 | End: 2022-12-30 | Stop reason: HOSPADM

## 2022-12-30 RX ORDER — OXYCODONE AND ACETAMINOPHEN 5; 325 MG/1; MG/1
1 TABLET ORAL
Status: DISCONTINUED | OUTPATIENT
Start: 2022-12-30 | End: 2023-01-01 | Stop reason: HOSPADM

## 2022-12-30 RX ORDER — DOCUSATE SODIUM 100 MG/1
100 CAPSULE, LIQUID FILLED ORAL 2 TIMES DAILY
Status: DISCONTINUED | OUTPATIENT
Start: 2022-12-30 | End: 2023-01-01 | Stop reason: HOSPADM

## 2022-12-30 RX ORDER — CEFAZOLIN SODIUM 1 G/3ML
INJECTION, POWDER, FOR SOLUTION INTRAMUSCULAR; INTRAVENOUS AS NEEDED
Status: DISCONTINUED | OUTPATIENT
Start: 2022-12-30 | End: 2022-12-30 | Stop reason: HOSPADM

## 2022-12-30 RX ORDER — MORPHINE SULFATE 0.5 MG/ML
INJECTION, SOLUTION EPIDURAL; INTRATHECAL; INTRAVENOUS AS NEEDED
Status: DISCONTINUED | OUTPATIENT
Start: 2022-12-30 | End: 2022-12-30 | Stop reason: HOSPADM

## 2022-12-30 RX ORDER — OXYCODONE AND ACETAMINOPHEN 5; 325 MG/1; MG/1
2 TABLET ORAL
Status: DISCONTINUED | OUTPATIENT
Start: 2022-12-30 | End: 2023-01-01 | Stop reason: HOSPADM

## 2022-12-30 RX ORDER — OXYCODONE AND ACETAMINOPHEN 5; 325 MG/1; MG/1
1 TABLET ORAL
Qty: 20 TABLET | Refills: 0 | Status: ON HOLD | OUTPATIENT
Start: 2022-12-30 | End: 2023-01-13

## 2022-12-30 RX ORDER — SIMETHICONE 80 MG
80 TABLET,CHEWABLE ORAL AS NEEDED
Status: DISCONTINUED | OUTPATIENT
Start: 2022-12-30 | End: 2023-01-01 | Stop reason: HOSPADM

## 2022-12-30 RX ORDER — SODIUM CHLORIDE, SODIUM LACTATE, POTASSIUM CHLORIDE, CALCIUM CHLORIDE 600; 310; 30; 20 MG/100ML; MG/100ML; MG/100ML; MG/100ML
125 INJECTION, SOLUTION INTRAVENOUS CONTINUOUS
Status: DISCONTINUED | OUTPATIENT
Start: 2022-12-30 | End: 2023-01-01 | Stop reason: HOSPADM

## 2022-12-30 RX ORDER — IBUPROFEN 800 MG/1
800 TABLET ORAL
Qty: 30 TABLET | Refills: 1 | Status: ON HOLD | OUTPATIENT
Start: 2022-12-30

## 2022-12-30 RX ADMIN — Medication 87.3 MILLI-UNITS/MIN: at 13:14

## 2022-12-30 RX ADMIN — DOCUSATE SODIUM 100 MG: 100 CAPSULE, LIQUID FILLED ORAL at 21:22

## 2022-12-30 RX ADMIN — SODIUM CHLORIDE, POTASSIUM CHLORIDE, SODIUM LACTATE AND CALCIUM CHLORIDE: 600; 310; 30; 20 INJECTION, SOLUTION INTRAVENOUS at 11:45

## 2022-12-30 RX ADMIN — LABETALOL HYDROCHLORIDE 200 MG: 200 TABLET, FILM COATED ORAL at 21:22

## 2022-12-30 RX ADMIN — CEFAZOLIN SODIUM 3 G: 1 INJECTION, POWDER, FOR SOLUTION INTRAMUSCULAR; INTRAVENOUS at 12:25

## 2022-12-30 RX ADMIN — MORPHINE SULFATE 0.2 MG: 0.5 INJECTION, SOLUTION EPIDURAL; INTRATHECAL; INTRAVENOUS at 12:19

## 2022-12-30 RX ADMIN — IBUPROFEN 800 MG: 800 TABLET, FILM COATED ORAL at 21:22

## 2022-12-30 RX ADMIN — SODIUM CHLORIDE, POTASSIUM CHLORIDE, SODIUM LACTATE AND CALCIUM CHLORIDE 125 ML/HR: 600; 310; 30; 20 INJECTION, SOLUTION INTRAVENOUS at 13:13

## 2022-12-30 RX ADMIN — Medication 2.5 MILLION UNITS: at 07:10

## 2022-12-30 RX ADMIN — BUTORPHANOL TARTRATE 1 MG: 1 INJECTION, SOLUTION INTRAMUSCULAR; INTRAVENOUS at 01:07

## 2022-12-30 RX ADMIN — DOCUSATE SODIUM 100 MG: 100 CAPSULE, LIQUID FILLED ORAL at 08:44

## 2022-12-30 RX ADMIN — Medication 25 MCG: at 00:59

## 2022-12-30 RX ADMIN — DEXAMETHASONE SODIUM PHOSPHATE 4 MG: 4 INJECTION, SOLUTION INTRA-ARTICULAR; INTRALESIONAL; INTRAMUSCULAR; INTRAVENOUS; SOFT TISSUE at 12:05

## 2022-12-30 RX ADMIN — LABETALOL HYDROCHLORIDE 200 MG: 200 TABLET, FILM COATED ORAL at 08:44

## 2022-12-30 RX ADMIN — BUPIVACAINE HYDROCHLORIDE 12 MG: 7.5 INJECTION, SOLUTION EPIDURAL; RETROBULBAR at 12:19

## 2022-12-30 RX ADMIN — Medication 500 ML/HR: at 12:40

## 2022-12-30 RX ADMIN — FAMOTIDINE 20 MG: 10 INJECTION INTRAVENOUS at 12:05

## 2022-12-30 RX ADMIN — ONDANSETRON 4 MG: 2 INJECTION INTRAMUSCULAR; INTRAVENOUS at 12:05

## 2022-12-30 RX ADMIN — Medication 2.5 MILLION UNITS: at 02:33

## 2022-12-30 RX ADMIN — MORPHINE SULFATE 4.8 MG: 0.5 INJECTION, SOLUTION EPIDURAL; INTRATHECAL; INTRAVENOUS at 12:56

## 2022-12-30 RX ADMIN — PHENYLEPHRINE HYDROCHLORIDE 50 MCG/MIN: 10 INJECTION INTRAVENOUS at 12:20

## 2022-12-30 RX ADMIN — SODIUM CHLORIDE, POTASSIUM CHLORIDE, SODIUM LACTATE AND CALCIUM CHLORIDE 125 ML/HR: 600; 310; 30; 20 INJECTION, SOLUTION INTRAVENOUS at 16:42

## 2022-12-30 NOTE — ANESTHESIA PREPROCEDURE EVALUATION
Relevant Problems   No relevant active problems       Anesthetic History   No history of anesthetic complications            Review of Systems / Medical History  Patient summary reviewed and pertinent labs reviewed    Pulmonary  Within defined limits                 Neuro/Psych         Psychiatric history     Cardiovascular    Hypertension                   GI/Hepatic/Renal     GERD           Endo/Other      Hypothyroidism  Obesity     Other Findings            Past Medical History:   Diagnosis Date    Anxiety     GERD (gastroesophageal reflux disease)     Headache     Hypercholesterolemia     Hypertension     Obesity     Phobia     Psychiatric disorder        Past Surgical History:   Procedure Laterality Date    HX TONSIL AND ADENOIDECTOMY      [place] CRYOCAUTERY OF CERVIX         Current Outpatient Medications   Medication Instructions    aspirin delayed-release 81 mg, Oral, DAILY    labetaloL (NORMODYNE) 200 mg tablet Take 1 tablet by mouth twice daily    omeprazole (PRILOSEC) 40 mg capsule 1 capsule    prenatal vitamin (Prenatal Multivitamins) 28 mg iron- 800 mcg tab tablet 1 Tablet, Oral, DAILY       Current Facility-Administered Medications   Medication Dose Route Frequency    penicillin G pot (PFIZERPEN) 2.5 Million Units in 50 ml 0.9% NaCl  2.5 Million Units IntraVENous Q4H    calcium carbonate (TUMS) chewable tablet 400 mg [elemental]  400 mg Oral TID PRN    docusate sodium (COLACE) capsule 100 mg  100 mg Oral DAILY    lactated Ringers infusion  125 mL/hr IntraVENous CONTINUOUS    lactated ringers bolus infusion 500 mL  500 mL IntraVENous PRN    sodium chloride (NS) flush 5-40 mL  5-40 mL IntraVENous PRN    oxytocin (PITOCIN) 10 unit bolus from bag  10 Units IntraVENous PRN    And    oxytocin (PITOCIN) 30 units in 500 mL infusion  87.3 jan-units/min IntraVENous PRN    naloxone (NARCAN) injection 0.4 mg  0.4 mg IntraVENous PRN    zolpidem (AMBIEN) tablet 5 mg  5 mg Oral QHS PRN    butorphanol (STADOL) injection 1 mg  1 mg IntraVENous Q3H PRN    labetaloL (NORMODYNE) tablet 200 mg  200 mg Oral Q12H       Patient Vitals for the past 24 hrs:   Temp Pulse Resp BP   12/30/22 1000 -- 91 -- 135/86   12/30/22 0900 37.1 °C (98.8 °F) 92 18 130/73   12/30/22 0800 -- 86 -- (!) 141/70   12/30/22 0713 37.1 °C (98.8 °F) 97 18 (!) 147/79   12/30/22 0430 -- (!) 102 -- (!) 160/104   12/30/22 0400 -- 89 -- (!) 155/97   12/30/22 0330 -- 92 -- (!) 149/83   12/30/22 0300 -- 84 -- (!) 146/82   12/30/22 0230 36.8 °C (98.3 °F) 92 -- (!) 147/91   12/30/22 0200 -- 92 -- (!) 153/96   12/30/22 0130 -- 90 -- (!) 143/85   12/30/22 0100 -- 100 -- 134/82   12/30/22 0032 -- 91 -- 136/73   12/30/22 0000 -- 96 -- 138/73   12/29/22 2330 -- 93 -- (!) 145/75   12/29/22 2300 -- 97 -- (!) 143/82   12/29/22 2230 -- 96 -- (!) 140/80   12/29/22 2200 -- (!) 104 -- (!) 143/85   12/29/22 2130 -- (!) 110 -- 134/82   12/29/22 2100 -- 98 -- 133/84   12/29/22 2030 -- 97 -- 129/76   12/29/22 2000 -- 91 -- (!) 142/84   12/29/22 1930 -- 90 -- 135/87   12/29/22 1900 -- (!) 101 -- (!) 146/95   12/29/22 1830 -- 97 -- 126/85   12/29/22 1800 -- 86 -- 123/72   12/29/22 1730 -- (!) 102 -- 102/77   12/29/22 1700 -- 96 -- (!) 148/87   12/29/22 1630 -- 93 -- 127/70   12/29/22 1600 36.9 °C (98.4 °F) 96 -- (!) 144/92   12/29/22 1530 -- 92 -- (!) 110/59   12/29/22 1520 -- -- -- 125/69   12/29/22 1410 -- 90 -- 115/61   12/29/22 1140 36.9 °C (98.4 °F) -- -- (!) 138/90       Lab Results   Component Value Date/Time    WBC 10.6 12/28/2022 04:15 PM    HGB 11.5 12/28/2022 04:15 PM    HCT 35.0 12/28/2022 04:15 PM    PLATELET 354 17/70/9280 04:15 PM    MCV 85.0 12/28/2022 04:15 PM     Lab Results   Component Value Date/Time    Sodium 138 12/28/2022 04:15 PM    Potassium 3.7 12/28/2022 04:15 PM    Chloride 108 12/28/2022 04:15 PM    CO2 21 12/28/2022 04:15 PM    Anion gap 9 12/28/2022 04:15 PM    Glucose 85 12/28/2022 04:15 PM    BUN 7 12/28/2022 04:15 PM Creatinine 0.42 (L) 12/28/2022 04:15 PM    BUN/Creatinine ratio 17 12/28/2022 04:15 PM    GFR est AA >60 03/07/2022 12:05 AM    GFR est non-AA >60 03/07/2022 12:05 AM    Calcium 9.1 12/28/2022 04:15 PM     No results found for: APTT, PTP, INR, INREXT  Lab Results   Component Value Date/Time    Glucose 85 12/28/2022 04:15 PM         Physical Exam    Airway  Mallampati: II  TM Distance: 4 - 6 cm  Neck ROM: normal range of motion   Mouth opening: Normal     Cardiovascular    Rhythm: regular  Rate: normal         Dental  No notable dental hx       Pulmonary  Breath sounds clear to auscultation               Abdominal  GI exam deferred       Other Findings            Anesthetic Plan    ASA: 2  Anesthesia type: spinal    Monitoring Plan: Continuous noninvasive hemodynamic monitoring        Anesthetic plan and risks discussed with: Patient and Family      Benefits and risks of spinal anesthesia discussed with patient/family. All questions answered.

## 2022-12-30 NOTE — OP NOTES
Section Delivery Procedure Note         Name: Felisa Nicolas      Medical Record Number: [id number]      YOB: 1985     Today's Date: 2022      Preoperative Diagnosis: Failure to Progress IUP AT 39 WEEKS    Postoperative Diagnosis: SAME + HX OF CRYO, 0 CM    Procedure: Low Cervical Transverse Procedure(s):   SECTION    Surgeon(s):  Nikunj Green MD    Anesthesia: * No anesthesia type entered *    Prophylactic Antibiotics: Ancef      CC   U/O 50 CC    Fetal Description: reddy female    Birth Information:   Information for the patient's newbornSally Kristenl, Pending [[id number]]        Umbilical Cord: 3 vessels present    Placenta:  spontaneous    Specimens: NONE           Complications:  none    Procedure Details: The patient was taken to the operating room, where spinal anesthesia was administered and found to be adequate. Bullock catheter had been placed using sterile technique. The patient was prepped and draped in the normal sterile fashion. The abdomen was entered using the Pfannenstiel technique. The peritoneum was entered sharply well superior to the bladder. The bladder blade was then inserted. The vesicouterine and peritoneum was identified and entered sharply with Metzenbaum scissors. The bladder flap was then created sharply and the bladder blade was reinserted. A low transverse uterine incision was made with the scalpel and extended laterally with blunt finger dissection. Amniotomy was performed and the fluid was medium amount clear. The babys head was then delivered atraumatically. The nose and mouth were suctioned. The cord was clamped and cut and the baby was handed off to the waiting  care unit staff. Placenta was then expressed from the uterus. The uterus was exteriorized and cleared of all clots and debris. The uterine incision was closed with number 1 Chromic in running locking fashion with good hemostasis assured.  The posterior cul-de-sac was irrigated with warm normal saline. Good hemostasis was again reassured and the uterus was returned to the abdomen. The anterior pelvis was irrigated with warm normal saline and good hemostasis was again reassured throughout. The rectus muscles were reapproximated in the midline with a series of simple stitches with 0 chromic. The fascia was closed with 0 Vicryl in a running fashion. Good hemostasis was assured. The subcuticular layers were reapproximated with 2-0 plain gut in interrupted fashion. The skin was closed with a 4-0 Vicryl in a subcuticular fashion. Dermabond was applied. The patient tolerated the procedure well. Sponge, lap, and needle counts were correct times three and the patient and baby were taken to recovery/postpartum room in stable condition.     SignedElbert Pickering MD      December 30, 2022

## 2022-12-30 NOTE — ANESTHESIA POSTPROCEDURE EVALUATION
Procedure(s):   SECTION. spinal    Anesthesia Post Evaluation      Multimodal analgesia: multimodal analgesia used between 6 hours prior to anesthesia start to PACU discharge  Patient location during evaluation: floor (Labor and Delivery Unit)  Patient participation: complete - patient participated  Level of consciousness: awake  Pain score: 0  Pain management: adequate  Airway patency: patent  Anesthetic complications: no  Cardiovascular status: acceptable  Respiratory status: acceptable  Hydration status: acceptable  Comments: The patient was transferred from the obstetric operating room to the L&D patient room. The patient was hemodynamically stable. Handoff was given to the nursing staff. All pre-, intra-, and postoperative questions were answered. Post anesthesia nausea and vomiting:  none  Final Post Anesthesia Temperature Assessment:  Normothermia (36.0-37.5 degrees C)      INITIAL Post-op Vital signs:   Vitals Value Taken Time   /87 22 1500   Temp 36.7 °C (98 °F) 22 1316   Pulse 80 22 1500   Resp 12 22 1316   SpO2 99 % 22 1510   Vitals shown include unvalidated device data.

## 2022-12-30 NOTE — ANESTHESIA PROCEDURE NOTES
Spinal Block    Start time: 12/30/2022 12:17 PM  End time: 12/30/2022 12:19 PM  Performed by: Nicole Mariscal CRNA  Authorized by: Maria Elena Moyer MD     Pre-procedure:   Indications: primary anesthetic  Preanesthetic Checklist: patient identified, risks and benefits discussed, anesthesia consent, site marked, patient being monitored and timeout performed    Timeout Time: 12:13 EST      Spinal Block:   Patient Position:  Seated  Prep Region:  Lumbar  Prep: DuraPrep      Location:  L3-4    Local: bupivacaine (PF) (MARCAINE) 0.75 % (7.5 mg/mL) Intrathecal - Intrathecal   12 mg - 12/30/2022 12:19:00 PM  morphine (PF) (DURAMORPH;ASTRAMORPH) 0.5 mg/mL injection - Intrathecal   0.2 mg - 12/30/2022 12:19:00 PM  Local Dose (mL):  3  Med Admin Time: 12/30/2022 12:19 PM    Needle:   Needle Type:  Pencan  Needle Gauge:  27 G  Attempts:  1      Events: CSF confirmed, no blood with aspiration and no paresthesia        Assessment:  Insertion:  Uncomplicated  Patient tolerance:  Patient tolerated the procedure well with no immediate complications

## 2022-12-30 NOTE — PROGRESS NOTES
1926: Assumed care of pt at this time. Bedside report received from SAMUEL Moss RN. Pt has no complaints at this time. 1030: Dr. Jennifer Roque and patient discussing plan of care.  section called at this time. 1545: Pt transferred to postpartum via bed. Bedside report given to SANJAY Muller

## 2022-12-30 NOTE — PROGRESS NOTES
L&D Progress Note    Admit Date: 12/28/2022      Subjective:     Patient states contractions are becoming stronger. Objective:     Patient Vitals for the past 8 hrs:   BP Pulse   12/29/22 2100 133/84 98   12/29/22 1900 (!) 146/95 (!) 101   12/29/22 1830 126/85 97   12/29/22 1800 123/72 86   12/29/22 1730 102/77 (!) 102     No intake/output data recorded. No intake/output data recorded. Current Facility-Administered Medications   Medication Dose Route Frequency    calcium carbonate (TUMS) chewable tablet 400 mg [elemental]  400 mg Oral TID PRN    docusate sodium (COLACE) capsule 100 mg  100 mg Oral DAILY    penicillin G pot (PFIZERPEN) 2.5 Million Units in 50 ml 0.9% NaCl  2.5 Million Units IntraVENous Q4H    lactated Ringers infusion  125 mL/hr IntraVENous CONTINUOUS    lactated ringers bolus infusion 500 mL  500 mL IntraVENous PRN    sodium chloride (NS) flush 5-40 mL  5-40 mL IntraVENous Q8H    sodium chloride (NS) flush 5-40 mL  5-40 mL IntraVENous PRN    oxytocin (PITOCIN) 10 unit bolus from bag  10 Units IntraVENous PRN    And    oxytocin (PITOCIN) 30 units in 500 mL infusion  87.3 jan-units/min IntraVENous PRN    naloxone (NARCAN) injection 0.4 mg  0.4 mg IntraVENous PRN    zolpidem (AMBIEN) tablet 5 mg  5 mg Oral QHS PRN    butorphanol (STADOL) injection 1 mg  1 mg IntraVENous Q3H PRN    miSOPROStol (CYTOTEC) tablet (prepacked) 25 mcg  25 mcg Buccal Q4H    labetaloL (NORMODYNE) tablet 200 mg  200 mg Oral Q12H        Physical Exam:   Gen: NAD  Resp: nml resp effort  Abd: gravid, NT    FHT: 145/mod/+Acc/overshoot x 1 @ 6736 where FHR shoots past baseline (after acceleration) to 120sx 1 minute, resolves spontaneously  [place]: ctxs q 2-4 mins    SVE: cl/th/high; SROM          Data Review No results found for this or any previous visit (from the past 24 hour(s)).         Assessment:     Principal Problem:    Chronic hypertension affecting pregnancy (6/23/2022)    Active Problems:    Group B Streptococcus carrier state affecting pregnancy (12/15/2022)      39 weeks gestation of pregnancy (2022)      Excessive fetal growth affecting management of pregnancy in third trimester (2022)        Plan:   -Discussed approximately 6 hours since spontaneous rupture and no cervical change. Discussed we can proceed with primary  if desired. NST is reactive and reassuring so she can proceed with IOL if desired. Patient would rather continue trying for vaginal delivery. Understands EFW could be impacting the delay in cervical chage, too. Cytotec No. 8 given. Understands that if after appropriate amount of time from time of rupture of membranes passes without adequate cervical change, that is arrest of dilation or if fetal intolerance develops, it is grounds to proceed with primary  section. Patient understands.  -Penicillin for GBS positive.  -Continue labetalol. Blood pressures normal to low mild range.

## 2022-12-30 NOTE — PROGRESS NOTES
1905: bedside shift report received from NATALIE Cope. Pt sitting up in recliner eating with family and  at the bedside. Ice and drinks provided to family. Not complaints from pt at this time    1945: SROM    1946: MD at the bedside, SVE performed and U/S completed to confirm head placement    0454: pt currently walking in [place]aiza with     8132: bedside shift report given to MCKENNA Cruz

## 2022-12-30 NOTE — PROGRESS NOTES
L&D Progress Note    Admit Date: 12/28/2022      Subjective:     Patient is feeling contractions. +SROM. Objective:     Patient Vitals for the past 8 hrs:   BP Temp Pulse   12/29/22 1730 102/77 -- (!) 102   12/29/22 1700 (!) 148/87 -- 96   12/29/22 1630 127/70 -- 93   12/29/22 1600 (!) 144/92 98.4 °F (36.9 °C) 96   12/29/22 1530 (!) 110/59 -- 92   12/29/22 1520 125/69 -- --   12/29/22 1410 115/61 -- 90   12/29/22 1140 (!) 138/90 98.4 °F (36.9 °C) --     No intake/output data recorded. No intake/output data recorded. Current Facility-Administered Medications   Medication Dose Route Frequency    calcium carbonate (TUMS) chewable tablet 400 mg [elemental]  400 mg Oral TID PRN    docusate sodium (COLACE) capsule 100 mg  100 mg Oral DAILY    lactated Ringers infusion  125 mL/hr IntraVENous CONTINUOUS    lactated ringers bolus infusion 500 mL  500 mL IntraVENous PRN    sodium chloride (NS) flush 5-40 mL  5-40 mL IntraVENous Q8H    sodium chloride (NS) flush 5-40 mL  5-40 mL IntraVENous PRN    oxytocin (PITOCIN) 10 unit bolus from bag  10 Units IntraVENous PRN    And    oxytocin (PITOCIN) 30 units in 500 mL infusion  87.3 jan-units/min IntraVENous PRN    naloxone (NARCAN) injection 0.4 mg  0.4 mg IntraVENous PRN    zolpidem (AMBIEN) tablet 5 mg  5 mg Oral QHS PRN    butorphanol (STADOL) injection 1 mg  1 mg IntraVENous Q3H PRN    miSOPROStol (CYTOTEC) tablet (prepacked) 25 mcg  25 mcg Buccal Q4H    labetaloL (NORMODYNE) tablet 200 mg  200 mg Oral Q12H        Physical Exam:   Gen: NAD  Resp: nml resp effort  Abd: gravid, NT    FHT: 145/mod/+Acc/no decels  [place]: ctxs q 2-4 mins    SVE: grossly ruptured, clear, large amount; closed/th/high  Bedside ultrasound: cephalic           Data Review No results found for this or any previous visit (from the past 24 hour(s)).         Assessment:     Principal Problem:    Chronic hypertension affecting pregnancy (6/23/2022)    Active Problems:    Group B Streptococcus carrier state affecting pregnancy (12/15/2022)      39 weeks gestation of pregnancy (12/28/2022)      Excessive fetal growth affecting management of pregnancy in third trimester (12/29/2022)        Plan:     Pt with SROM but SVE still closed. Unable to do arroyo balloon and not yet favorable for pitocin. Patient desires to continue IOL. Continue buccal cytotec (will be dose 7). Understands if no cervical change after appropriate interval now that with SROM, will discuss possibility of PCS.

## 2022-12-30 NOTE — PROGRESS NOTES
1550: TRANSFER - IN REPORT:    Verbal report received from GILBERT Reed on [employer]  being received from L and PRIYA for routine post - op      Report consisted of patients Situation, Background, Assessment and   Recommendations(SBAR). Information from the following report(s) SBAR and MAR was reviewed with the receiving nurse. Opportunity for questions and clarification was provided. Oriented to unit, remote, diet, tobacco free campus, white board, policies and visitation. Mom handbook and birth certificate given along with information handouts regarding hourly rounds and side effects of medication. Patient aware to call when out of bed for first time. Call light in reach. No questions. States understanding. 1700: dr Hopkins Estimable aware BP since admission to unit.  No orders given

## 2022-12-31 LAB
BASOPHILS # BLD: 0.1 K/UL (ref 0–0.1)
BASOPHILS NFR BLD: 0 % (ref 0–1)
DIFFERENTIAL METHOD BLD: ABNORMAL
EOSINOPHIL # BLD: 0 K/UL (ref 0–0.4)
EOSINOPHIL NFR BLD: 0 % (ref 0–7)
ERYTHROCYTE [DISTWIDTH] IN BLOOD BY AUTOMATED COUNT: 15.3 % (ref 11.5–14.5)
HCT VFR BLD AUTO: 31.6 % (ref 35–47)
HGB BLD-MCNC: 10.1 G/DL (ref 11.5–16)
IMM GRANULOCYTES # BLD AUTO: 0.1 K/UL (ref 0–0.04)
IMM GRANULOCYTES NFR BLD AUTO: 1 % (ref 0–0.5)
LYMPHOCYTES # BLD: 2.3 K/UL (ref 0.8–3.5)
LYMPHOCYTES NFR BLD: 16 % (ref 12–49)
MCH RBC QN AUTO: 27.7 PG (ref 26–34)
MCHC RBC AUTO-ENTMCNC: 32 G/DL (ref 30–36.5)
MCV RBC AUTO: 86.8 FL (ref 80–99)
MONOCYTES # BLD: 1.6 K/UL (ref 0–1)
MONOCYTES NFR BLD: 11 % (ref 5–13)
NEUTS SEG # BLD: 10.2 K/UL (ref 1.8–8)
NEUTS SEG NFR BLD: 72 % (ref 32–75)
NRBC # BLD: 0 K/UL (ref 0–0.01)
NRBC BLD-RTO: 0 PER 100 WBC
PLATELET # BLD AUTO: 185 K/UL (ref 150–400)
PMV BLD AUTO: 11.7 FL (ref 8.9–12.9)
RBC # BLD AUTO: 3.64 M/UL (ref 3.8–5.2)
WBC # BLD AUTO: 14.2 K/UL (ref 3.6–11)

## 2022-12-31 PROCEDURE — 74011250637 HC RX REV CODE- 250/637: Performed by: OBSTETRICS & GYNECOLOGY

## 2022-12-31 PROCEDURE — 65410000002 HC RM PRIVATE OB

## 2022-12-31 PROCEDURE — 85025 COMPLETE CBC W/AUTO DIFF WBC: CPT

## 2022-12-31 PROCEDURE — 36415 COLL VENOUS BLD VENIPUNCTURE: CPT

## 2022-12-31 RX ADMIN — IBUPROFEN 800 MG: 800 TABLET, FILM COATED ORAL at 05:11

## 2022-12-31 RX ADMIN — DOCUSATE SODIUM 100 MG: 100 CAPSULE, LIQUID FILLED ORAL at 21:15

## 2022-12-31 RX ADMIN — LABETALOL HYDROCHLORIDE 200 MG: 200 TABLET, FILM COATED ORAL at 08:44

## 2022-12-31 RX ADMIN — IBUPROFEN 800 MG: 800 TABLET, FILM COATED ORAL at 21:15

## 2022-12-31 RX ADMIN — IBUPROFEN 800 MG: 800 TABLET, FILM COATED ORAL at 14:20

## 2022-12-31 RX ADMIN — LABETALOL HYDROCHLORIDE 200 MG: 200 TABLET, FILM COATED ORAL at 21:15

## 2022-12-31 RX ADMIN — DOCUSATE SODIUM 100 MG: 100 CAPSULE, LIQUID FILLED ORAL at 08:44

## 2022-12-31 NOTE — PROGRESS NOTES
Post-Operative  Day 1    Alycia Reynolds     Information for the patient's :  Ellena Riedel [[id number]]   , Low Transverse  Patient doing well without significant complaint. Nausea and vomiting resolved, tolerating liquids, no flatus, arroyo in place. Vitals:  Visit Vitals  /80 (BP 1 Location: Left upper arm, BP Patient Position: Sitting)   Pulse (!) 102   Temp 97.8 °F (36.6 °C)   Resp 18   Ht 5' 2\" (1.575 m)   Wt 227 lb (103 kg)   SpO2 100%   Breastfeeding Unknown   BMI 41.52 kg/m²     Temp (24hrs), Av.4 °F (36.9 °C), Min:97.8 °F (36.6 °C), Max:99.7 °F (37.6 °C)         Intake and Output:   Current shift: 1901 -  07  In: -   Out: 600 [Urine:600]  Last 3 completed shifts:  07 -  190  In: 600 [I.V.:600]  Out: 800 [Urine:450]        Exam:        Patient without distress. Lungs clear. Abdomen, bowel sounds present, soft, expected tenderness, fundus firm   ERIKA dressing in place     Perineum normal lochia noted               Lower extremities are negative for swelling, cords or tenderness. Labs:   Lab Results   Component Value Date/Time    WBC 10.6 2022 04:15 PM    WBC 9.1 2022 01:32 PM    WBC 8.7 2022 12:08 PM    WBC 12.5 (H) 2022 12:05 AM    HGB 11.5 2022 04:15 PM    HGB 10.4 (L) 2022 01:32 PM    HGB 12.5 2022 12:08 PM    HGB 13.5 2022 12:05 AM    HCT 35.0 2022 04:15 PM    HCT 31.3 (L) 2022 01:32 PM    HCT 38.7 2022 12:08 PM    HCT 40.4 2022 12:05 AM    PLATELET 278  04:15 PM    PLATELET 330  01:32 PM    PLATELET 190 15/80/9682 12:08 PM    PLATELET 862  12:05 AM       No results found for this or any previous visit (from the past 24 hour(s)). Assessment: Post-Op day 1, stable      Plan:   1. Routine post-operative care   2.  N/A

## 2022-12-31 NOTE — PROGRESS NOTES
Bullock discontinued as ordered . Assist pt to the bathroom with steady gait. Yasmine care instructions given and demonstrated well in return. Lochia small amont of rubra noted.

## 2023-01-01 VITALS
BODY MASS INDEX: 41.77 KG/M2 | OXYGEN SATURATION: 100 % | WEIGHT: 227 LBS | HEART RATE: 116 BPM | SYSTOLIC BLOOD PRESSURE: 164 MMHG | RESPIRATION RATE: 18 BRPM | HEIGHT: 62 IN | TEMPERATURE: 98.8 F | DIASTOLIC BLOOD PRESSURE: 88 MMHG

## 2023-01-01 DIAGNOSIS — G89.18 POSTOPERATIVE PAIN: Primary | ICD-10-CM

## 2023-01-01 PROCEDURE — 74011250637 HC RX REV CODE- 250/637: Performed by: OBSTETRICS & GYNECOLOGY

## 2023-01-01 PROCEDURE — 99024 POSTOP FOLLOW-UP VISIT: CPT | Performed by: OBSTETRICS & GYNECOLOGY

## 2023-01-01 RX ORDER — ACETAMINOPHEN 325 MG/1
650 TABLET ORAL
Status: DISCONTINUED | OUTPATIENT
Start: 2023-01-01 | End: 2023-01-01 | Stop reason: HOSPADM

## 2023-01-01 RX ORDER — OXYCODONE HYDROCHLORIDE 5 MG/1
5 TABLET ORAL
Qty: 15 TABLET | Refills: 0 | Status: ON HOLD | OUTPATIENT
Start: 2023-01-01 | End: 2023-01-15

## 2023-01-01 RX ADMIN — IBUPROFEN 800 MG: 800 TABLET, FILM COATED ORAL at 05:45

## 2023-01-01 RX ADMIN — OXYCODONE AND ACETAMINOPHEN 1 TABLET: 5; 325 TABLET ORAL at 05:45

## 2023-01-01 RX ADMIN — IBUPROFEN 800 MG: 800 TABLET, FILM COATED ORAL at 13:50

## 2023-01-01 RX ADMIN — LABETALOL HYDROCHLORIDE 200 MG: 200 TABLET, FILM COATED ORAL at 09:18

## 2023-01-01 RX ADMIN — DOCUSATE SODIUM 100 MG: 100 CAPSULE, LIQUID FILLED ORAL at 09:18

## 2023-01-01 RX ADMIN — ACETAMINOPHEN 650 MG: 325 TABLET, FILM COATED ORAL at 01:46

## 2023-01-01 RX ADMIN — OXYCODONE AND ACETAMINOPHEN 1 TABLET: 5; 325 TABLET ORAL at 12:25

## 2023-01-01 RX ADMIN — SIMETHICONE 80 MG: 80 TABLET, CHEWABLE ORAL at 00:41

## 2023-01-01 NOTE — PROGRESS NOTES
1540- pt wheeled to front entrance with  in car seat and support persons at side. 12- Pt educated on discharge instructions and prescriptions sent to preferred pharmacy. Aware of when next dose can be taken and warning signs to watch for/ notify MD. Discharge plan of care validated with provider discharge order.         Problem: Patient Education: Go to Patient Education Activity  Goal: Patient/Family Education  2023 1317 by Shruthi Lorenzo RN  Outcome: Resolved/Met  2023 0929 by Shruthi Lorenzo RN  Outcome: Progressing Towards Goal     Problem:  Delivery: Day of Delivery  Goal: Off Pathway (Use only if patient is Off Pathway)  Outcome: Resolved/Met  Goal: Activity/Safety  Outcome: Resolved/Met  Goal: Consults, if ordered  Outcome: Resolved/Met  Goal: Diagnostic Test/Procedures  Outcome: Resolved/Met  Goal: Nutrition/Diet  Outcome: Resolved/Met  Goal: Discharge Planning  Outcome: Resolved/Met  Goal: Medications  Outcome: Resolved/Met  Goal: Respiratory  Outcome: Resolved/Met  Goal: Treatments/Interventions/Procedures  Outcome: Resolved/Met  Goal: Psychosocial  Outcome: Resolved/Met  Goal: *Vital signs within defined limits  Outcome: Resolved/Met  Goal: *Labs within defined limits  Outcome: Resolved/Met  Goal: *Hemodynamically stable  Outcome: Resolved/Met  Goal: *Optimal pain control at patient's stated goal  Outcome: Resolved/Met  Goal: *Participates in infant care  Outcome: Resolved/Met  Goal: *Demonstrates progressive activity  Outcome: Resolved/Met  Goal: *Tolerating diet  Outcome: Resolved/Met     Problem:  Delivery: Postpartum Day 1  Goal: Off Pathway (Use only if patient is Off Pathway)  Outcome: Resolved/Met  Goal: Activity/Safety  Outcome: Resolved/Met  Goal: Consults, if ordered  Outcome: Resolved/Met  Goal: Diagnostic Test/Procedures  Outcome: Resolved/Met  Goal: Nutrition/Diet  Outcome: Resolved/Met  Goal: Discharge Planning  Outcome: Resolved/Met  Goal: Medications  Outcome: Resolved/Met  Goal: Respiratory  Outcome: Resolved/Met  Goal: Treatments/Interventions/Procedures  Outcome: Resolved/Met  Goal: Psychosocial  Outcome: Resolved/Met  Goal: *Vital signs within defined limits  Outcome: Resolved/Met  Goal: *Labs within defined limits  Outcome: Resolved/Met  Goal: *Hemodynamically stable  Outcome: Resolved/Met  Goal: *Optimal pain control at patient's stated goal  Outcome: Resolved/Met  Goal: *Participates in infant care  Outcome: Resolved/Met  Goal: *Demonstrates progressive activity  Outcome: Resolved/Met  Goal: *Tolerating diet  Outcome: Resolved/Met  Goal: *Performs self perineal care  Outcome: Resolved/Met     Problem:  Delivery: Postpartum Day 2  Goal: Off Pathway (Use only if patient is Off Pathway)  2023 1317 by Chris Oneal RN  Outcome: Resolved/Met  2023 by Chris Oneal RN  Outcome: Progressing Towards Goal  Goal: Activity/Safety  2023 1317 by Chris Oneal RN  Outcome: Resolved/Met  2023 09 by Chris Oneal RN  Outcome: Progressing Towards Goal  Goal: Consults, if ordered  2023 1317 by Chris Oneal RN  Outcome: Resolved/Met  2023 by Chris Oneal RN  Outcome: Progressing Towards Goal  Goal: Nutrition/Diet  2023 1317 by Chris Oneal RN  Outcome: Resolved/Met  2023 by Chris Oneal RN  Outcome: Progressing Towards Goal  Goal: Discharge Planning  2023 1317 by Chris Oneal RN  Outcome: Resolved/Met  2023 by Chris Oneal RN  Outcome: Progressing Towards Goal  Goal: Medications  2023 1317 by Chris Oneal RN  Outcome: Resolved/Met  2023 by Chris Oneal RN  Outcome: Progressing Towards Goal  Goal: Treatments/Interventions/Procedures  Outcome: Resolved/Met  Goal: Psychosocial  Outcome: Resolved/Met  Goal: *Vital signs within defined limits  Outcome: Resolved/Met  Goal: *Labs within defined limits  Outcome: Resolved/Met  Goal: *Hemodynamically stable  Outcome: Resolved/Met  Goal: *Optimal pain control at patient's stated goal  Outcome: Resolved/Met  Goal: *Participates in infant care  Outcome: Resolved/Met  Goal: *Demonstrates progressive activity  Outcome: Resolved/Met  Goal: *Appropriate parent-infant bonding  Outcome: Resolved/Met  Goal: *Tolerating diet  Outcome: Resolved/Met     Problem:  Delivery: Postpartum Day 3  Goal: Off Pathway (Use only if patient is Off Pathway)  Outcome: Resolved/Met  Goal: Activity/Safety  Outcome: Resolved/Met  Goal: Consults, if ordered  Outcome: Resolved/Met  Goal: Nutrition/Diet  Outcome: Resolved/Met  Goal: Discharge Planning  Outcome: Resolved/Met  Goal: Medications  Outcome: Resolved/Met  Goal: Treatments/Interventions/Procedures  Outcome: Resolved/Met  Goal: Psychosocial  Outcome: Resolved/Met     Problem:  Delivery: Discharge Outcomes  Goal: *Follow-up appointments as indicated  Outcome: Resolved/Met  Goal: *Describes available resources and support systems  Outcome: Resolved/Met  Goal: *No signs and symptoms of infection  Outcome: Resolved/Met  Goal: *Birth certificate information completed  Outcome: Resolved/Met  Goal: *Received and verbalizes understanding of discharge plan and instructions  Outcome: Resolved/Met  Goal: *Vital signs within defined limits  Outcome: Resolved/Met  Goal: *Labs within defined limits  Outcome: Resolved/Met  Goal: *Hemodynamically stable  Outcome: Resolved/Met  Goal: *Optimal pain control at patient's stated goal  Outcome: Resolved/Met  Goal: *Participates in infant care  Outcome: Resolved/Met  Goal: *Demonstrates progressive activity  Outcome: Resolved/Met  Goal: *Appropriate parent-infant bonding  Outcome: Resolved/Met  Goal: *Tolerating diet  Outcome: Resolved/Met  Goal: *Verbalizes name, dosage, time, side effects, and number of days to continue medications  Outcome: Resolved/Met  Goal: *Influenza vaccine administered (October-March)  Outcome: Resolved/Met

## 2023-01-01 NOTE — DISCHARGE SUMMARY
Name: Jerica Martin MRN: [id number]  SSN: [id number]    YOB: 1985  Age: 42 y.o. Sex: female      Admit Date: 2022    Discharge Date: 2023     Admitting Physician: Poncho Joseph MD     Attending Physician:  Qasim Wilson MD     Admission Diagnoses: Pregnancy [Z34.90]    Discharge Diagnoses:   Information for the patient's :  Jimi Suarez [[id number]]   Delivery of a 9 lb 10.2 oz (4.37 kg) female infant via , Low Transverse on 2022 at 12:39 PM  by Ama Jones. Apgars were 8  and 9 . Additional Diagnoses:   Hospital Problems  Date Reviewed: 2022            Codes Class Noted POA    Excessive fetal growth affecting management of pregnancy in third trimester ICD-10-CM: O36.63X0  ICD-9-CM: 656.63  2022 Unknown        39 weeks gestation of pregnancy ICD-10-CM: Z3A.39  ICD-9-CM: V22.2  2022 Unknown        Group B Streptococcus carrier state affecting pregnancy ICD-10-CM: O99.820  ICD-9-CM: 648.90, V02.51  12/15/2022 Yes        * (Principal) Chronic hypertension affecting pregnancy ICD-10-CM: O10.919  ICD-9-CM: 642.00  2022 Yes          Lab Results   Component Value Date/Time    ABO/Rh(D) O Positive 2022 04:15 PM       Immunization(s): There is no immunization history for the selected administration types on file for this patient. Hospital Course: Normal hospital course following the delivery. Patient Instructions:   Current Discharge Medication List        START taking these medications    Details   ibuprofen (MOTRIN) 800 mg tablet Take 1 Tablet by mouth every eight (8) hours as needed for Pain. Qty: 30 Tablet, Refills: 1      oxyCODONE-acetaminophen (PERCOCET) 5-325 mg per tablet Take 1 Tablet by mouth every four (4) hours as needed for Pain for up to 14 days. Max Daily Amount: 6 Tablets.   Qty: 20 Tablet, Refills: 0    Associated Diagnoses: Postoperative pain           CONTINUE these medications which have NOT CHANGED    Details   labetaloL (NORMODYNE) 200 mg tablet Take 1 tablet by mouth twice daily  Qty: 60 Tablet, Refills: 3    Associated Diagnoses: Chronic hypertension affecting pregnancy      omeprazole (PRILOSEC) 40 mg capsule 1 capsule      prenatal vitamin (Prenatal Multivitamins) 28 mg iron- 800 mcg tab tablet Take 1 Tablet by mouth daily. Qty: 90 Tablet, Refills: 3    Associated Diagnoses: Encounter for supervision of normal first pregnancy in second trimester      aspirin delayed-release 81 mg tablet Take 1 Tablet by mouth daily. Qty: 90 Tablet, Refills: 3    Associated Diagnoses: Chronic hypertension affecting pregnancy             Reference my discharge instructions. Follow-up Appointments   Procedures    FOLLOW UP VISIT Appointment in: One Week     Standing Status:   Standing     Number of Occurrences:   1     Order Specific Question:   Appointment in     Answer: One Week        Signed By:  Amy Campos MD     January 1, 2023      \"[id number]\"     I spent 30 minutes or less with the patient to perform a final examination, discuss the hospital stay, give instructions for continuing care to all relevant caregivers and prepare discharge records, prescriptions and referral forms.

## 2023-01-01 NOTE — PROGRESS NOTES
Post-Operative  Day 2    Yamila Moore     Information for the patient's :  Yisel Whalen [[id number]]   , Low Transverse  Patient doing well without significant complaint. Tolerating diet, passing flatus, voiding and ambulating without difficulty    Vitals:  Visit Vitals  BP (!) 142/85 (BP 1 Location: Right upper arm, BP Patient Position: At rest;Semi fowlers)   Pulse 92   Temp 97.9 °F (36.6 °C)   Resp 18   Ht 5' 2\" (1.575 m)   Wt 227 lb (103 kg)   SpO2 100%   Breastfeeding Unknown   BMI 41.52 kg/m²     Temp (24hrs), Av.3 °F (36.8 °C), Min:97.9 °F (36.6 °C), Max:98.7 °F (37.1 °C)        Exam:        Patient without distress. Abdomen, bowel sounds present, soft, expected tenderness, fundus firm    ERIKA IN PLACE               Lower extremities are negative for swelling, cords or tenderness. Labs:   Lab Results   Component Value Date/Time    WBC 14.2 (H) 2022 05:34 AM    WBC 10.6 2022 04:15 PM    WBC 9.1 2022 01:32 PM    WBC 8.7 2022 12:08 PM    WBC 12.5 (H) 2022 12:05 AM    HGB 10.1 (L) 2022 05:34 AM    HGB 11.5 2022 04:15 PM    HGB 10.4 (L) 2022 01:32 PM    HGB 12.5 2022 12:08 PM    HGB 13.5 2022 12:05 AM    HCT 31.6 (L) 2022 05:34 AM    HCT 35.0 2022 04:15 PM    HCT 31.3 (L) 2022 01:32 PM    HCT 38.7 2022 12:08 PM    HCT 40.4 2022 12:05 AM    PLATELET 928  05:34 AM    PLATELET 168  04:15 PM    PLATELET 679  01:32 PM    PLATELET 601  12:08 PM    PLATELET 263  12:05 AM       No results found for this or any previous visit (from the past 24 hour(s)). Assessment: Post-Op day 2, doing well      Plan:   1. Discharge home today  2. ERIKA to be removed in office in a 7 - 10 days  3. Follow up in office in 6 weeks with Dennis Akbar MD  3. Post partum activity advised, diet as tolerated  4.  Discharge Medications: ibuprofen, percocet and medications prior to admission

## 2023-01-01 NOTE — DISCHARGE INSTRUCTIONS
Depression After Childbirth: Care Instructions  It's common to lose sleep, feel irritable, and cry easily during the first few days after childbirth. Hormone changes and the demands of a new baby can cause these \"baby blues. \" If these mood changes last more than 2 weeks, you may have postpartum depression. This is a medical condition that requires treatment. If you have any of these signs, you may be depressed. See your doctor right away. You feel very sad or hopeless and lose interest in daily activities. You sleep too much or not enough. You feel tired or as if you have no energy. You eat too much or too little. You write or talk about death. Where to get help 24 hours a day, 7 days a week   If you or someone you know talks about suicide, self-harm, a mental health crisis, a substance use crisis, or any other kind of emotional distress, get help right away. You can:   Call the Suicide and Crisis Lifeline at 65. Call 6-158-428-UJTY (). 9-625.559.5202    Text HOME to 288359 to access the Crisis Text Line. Consider saving these numbers in your phone. What you can do   Try to go to all of your counseling sessions. Take medicines as directed. Eat healthy foods. Get daily exercise, such as walks. Try to get some sunlight every day. Avoid using alcohol or other substances. Get as much rest as possible. Connect with friends, and join a support group for new parents. When should you call for help? Call 911 if: You feel you cannot stop from hurting yourself, your baby, or someone else. Call your doctor now or seek immediate medical care if:    You are having trouble caring for yourself or your baby. You hear voices. Contact your doctor if:    You have problems with your medicines. You do not get better as expected. Follow-up care is a key part of your treatment and safety.  Be sure to make and go to all appointments, and call your doctor if you are having problems. It's also a good idea to know your test results and keep a list of the medicines you take. Where can you learn more? Go to http://www.gray.com/  Enter [id number] in the search box to learn more about \"Depression After Childbirth: Care Instructions. \"  Current as of: 2022               Content Version: 13.4   [employer]. Care instructions adapted under license by [employer] (which disclaims liability or warranty for this information). If you have questions about a medical condition or this instruction, always ask your healthcare professional. [place] [address] any warranty or liability for your use of this information.  Section: What to Expect at [address] [place] [address]     A  section, or , is surgery to deliver your baby through a cut that the doctor makes in your lower belly and uterus. The cut is called an incision. You may have some pain in your lower belly and need pain medicine for 1 to 2 weeks. You can expect some vaginal bleeding for several weeks. You will probably need about 6 weeks to fully recover. It's important to take it easy while the incision heals. Avoid heavy lifting, strenuous activities, and exercises that strain the belly muscles while you recover. Ask a family member or friend for help with housework, cooking, and shopping. This care sheet gives you a general idea about how long it will take for you to recover. But each person recovers at a different pace. Follow the steps below to get better as quickly as possible. How can you care for yourself at home? Activity    Rest when you feel tired. Getting enough sleep will help you recover. Try to walk each day. Start by walking a little more than you did the day before. Bit by bit, increase the amount you walk. Walking boosts blood flow and helps prevent pneumonia, constipation, and blood clots.      Avoid strenuous activities, such as bicycle riding, jogging, weightlifting, and aerobic exercise, for 6 weeks or until your doctor says it is okay. Until your doctor says it is okay, do not lift anything heavier than your baby. Do not do sit-ups or other exercises that strain the belly muscles for 6 weeks or until your doctor says it is okay. Hold a pillow over your incision when you cough or take deep breaths. This will support your belly and decrease your pain. You may shower as usual. Pat the incision dry when you are done. You will have some vaginal bleeding. Wear sanitary pads. Do not douche or use tampons until your doctor says it is okay. Ask your doctor when you can drive again. You will probably need to take at least 6 weeks off work. It depends on the type of work you do and how you feel. Ask your doctor when it is okay for you to have sex. Diet    You can eat your normal diet. If your stomach is upset, try bland, low-fat foods like plain rice, broiled chicken, toast, and yogurt. Drink plenty of fluids (unless your doctor tells you not to). You may notice that your bowel movements are not regular right after your surgery. This is common. Try to avoid constipation and straining with bowel movements. You may want to take a fiber supplement every day. If you have not had a bowel movement after a couple of days, ask your doctor about taking a mild laxative. If you are breastfeeding, limit alcohol. Alcohol can cause a lack of energy and other health problems for the baby when a breastfeeding woman drinks heavily. It can also get in the way of a mom's ability to feed her baby or to care for the child in other ways. There isn't a lot of research about exactly how much alcohol can harm a baby. Having no alcohol is the safest choice for your baby. If you choose to have a drink now and then, have only one drink, and limit the number of occasions that you have a drink.  Wait to breastfeed at least 2 hours after you have a drink to reduce the amount of alcohol the baby may get in the milk. Medicines    Your doctor will tell you if and when you can restart your medicines. You will also get instructions about taking any new medicines. If you stopped taking aspirin or some other blood thinner, your doctor will tell you when to start taking it again. Take pain medicines exactly as directed. If the doctor gave you a prescription medicine for pain, take it as prescribed. If you are not taking a prescription pain medicine, ask your doctor if you can take an over-the-counter medicine. If you think your pain medicine is making you sick to your stomach: Take your medicine after meals (unless your doctor has told you not to). Ask your doctor for a different pain medicine. If your doctor prescribed antibiotics, take them as directed. Do not stop taking them just because you feel better. You need to take the full course of antibiotics. Incision care    If you have strips of tape on the incision, leave the tape on for a week or until it falls off. Wash the area daily with warm, soapy water, and pat it dry. Don't use hydrogen peroxide or alcohol, which can slow healing. You may cover the area with a gauze bandage if it weeps or rubs against clothing. Change the bandage every day. Keep the area clean and dry. Other instructions    If you breastfeed your baby, you may be more comfortable while you are healing if you don't rest your baby on your belly. Try tucking your baby under your arm, with your baby's body along the side you will be feeding on. Support your baby's upper body with your arm. With that hand you can control your baby's head to bring your baby's mouth to your breast. This is sometimes called the football hold. Follow-up care is a key part of your treatment and safety.  Be sure to make and go to all appointments, and call your doctor if you are having problems. It's also a good idea to know your test results and keep a list of the medicines you take. When should you call for help? Share this information with your partner, family, or a friend. They can help you watch for warning signs. Call 911  anytime you think you may need emergency care. For example, call if:    You have thoughts of harming yourself, your baby, or another person. You passed out (lost consciousness). You have chest pain, are short of breath, or cough up blood. You have a seizure. Call your doctor now or seek immediate medical care if:    You have loose stitches, or your incision comes open. You have signs of hemorrhage (too much bleeding), such as:  Heavy vaginal bleeding. This means that you are soaking through one or more pads in an hour. Or you pass blood clots bigger than an egg. Feeling dizzy or lightheaded, or you feel like you may faint. Feeling so tired or weak that you cannot do your usual activities. A fast or irregular heartbeat. New or worse belly pain. You have symptoms of infection, such as: Increased pain, swelling, warmth, or redness. Red streaks leading from the incision. Pus draining from the incision. A fever. Vaginal discharge that smells bad. New or worse belly pain. You have symptoms of a blood clot in your leg (called a deep vein thrombosis), such as:  Pain in your calf, back of the knee, thigh, or groin. Redness and swelling in your leg or groin. You have signs of preeclampsia, such as:  Sudden swelling of your face, hands, or feet. New vision problems (such as dimness, blurring, or seeing spots). A severe headache. Watch closely for changes in your health, and be sure to contact your doctor if:    Your vaginal bleeding isn't decreasing. You feel sad, anxious, or hopeless for more than a few days. You are having problems with your breasts or breastfeeding. Where can you learn more?   Go to http://www.gray.com/  Enter M806 in the search box to learn more about \" Section: What to Expect at Home. \"  Current as of: 2022               Content Version: 13.4   [employer]. Care instructions adapted under license by [employer] (which disclaims liability or warranty for this information). If you have questions about a medical condition or this instruction, always ask your healthcare professional. [address] any warranty or liability for your use of this information. Postpartum Breast Care When You Don't Plan to Breastfeed: Care Instructions  Overview  Your breasts will start to make milk in the first couple of days after you give birth. This happens even if you don't breastfeed. You may have some milk leak from your breasts, and your breasts may feel sore and swollen. This is called engorgement. It usually gets better after several days. Over time, your body will stop making milk if you don't breastfeed or pump. This can take up to several weeks. You can take steps at home to decrease your discomfort and help your breasts stop making milk. Follow-up care is a key part of your treatment and safety. Be sure to make and go to all appointments, and call your doctor if you are having problems. It's also a good idea to know your test results and keep a list of the medicines you take. How can you care for yourself at home? Don't pump or remove milk from your breasts by hand. Wear a bra that fits well and provides good support. You may find that it helps to wear a bra even while you sleep. Apply a cold pack to your breasts for 15 minutes at a time every hour as needed. You can use a frozen wet towel, a cold pack, or a bag of frozen vegetables. To prevent damage to your skin, put a thin cloth between the cold pack and your skin. Take ibuprofen (such as Advil or Motrin) to reduce pain and swelling. Be safe with medicines. Read and follow all instructions on the label. When should you call for help? Call your doctor now or seek immediate medical care if:    You have symptoms of a breast infection, such as: Increased pain, swelling, redness, or warmth around a breast.  Red streaks leading from your breast.  Pus draining from your breast.  A fever. Watch closely for changes in your health, and be sure to contact your doctor if:    You do not get better as expected. Where can you learn more? Go to http://www.gray.com/  Enter B385 in the search box to learn more about \"Postpartum Breast Care When You Don't Plan to Breastfeed: Care Instructions. \"  Current as of: February 23, 2022               Content Version: 13.4  © 2006-2022 [employer], [employer]. Care instructions adapted under license by [employer] (which disclaims liability or warranty for this information). If you have questions about a medical condition or this instruction, always ask your healthcare professional. [place] [address] any warranty or liability for your use of this information.

## 2023-01-03 ENCOUNTER — TELEPHONE (OUTPATIENT)
Dept: OBGYN CLINIC | Age: 38
End: 2023-01-03

## 2023-01-03 NOTE — TELEPHONE ENCOUNTER
Spoke with the patient and appointment has been scheduled on 01/06/23 at 10:30 in the [place] office.

## 2023-01-04 ENCOUNTER — HOSPITAL ENCOUNTER (INPATIENT)
Age: 38
LOS: 3 days | Discharge: HOME | End: 2023-01-09
Attending: STUDENT IN AN ORGANIZED HEALTH CARE EDUCATION/TRAINING PROGRAM | Admitting: OBSTETRICS & GYNECOLOGY
Payer: COMMERCIAL

## 2023-01-04 DIAGNOSIS — R60.0 LOWER EXTREMITY EDEMA: ICD-10-CM

## 2023-01-04 PROCEDURE — 96375 TX/PRO/DX INJ NEW DRUG ADDON: CPT

## 2023-01-04 PROCEDURE — 93005 ELECTROCARDIOGRAM TRACING: CPT

## 2023-01-04 PROCEDURE — 96376 TX/PRO/DX INJ SAME DRUG ADON: CPT

## 2023-01-04 PROCEDURE — 99285 EMERGENCY DEPT VISIT HI MDM: CPT

## 2023-01-04 PROCEDURE — 96374 THER/PROPH/DIAG INJ IV PUSH: CPT

## 2023-01-05 PROBLEM — I10 HYPERTENSION: Status: ACTIVE | Noted: 2023-01-05

## 2023-01-05 LAB
ALBUMIN SERPL-MCNC: 2.6 G/DL (ref 3.5–5)
ALBUMIN/GLOB SERPL: 0.7 (ref 1.1–2.2)
ALP SERPL-CCNC: 144 U/L (ref 45–117)
ALT SERPL-CCNC: 61 U/L (ref 12–78)
ANION GAP SERPL CALC-SCNC: 7 MMOL/L (ref 5–15)
APPEARANCE UR: ABNORMAL
AST SERPL W P-5'-P-CCNC: 60 U/L (ref 15–37)
ATRIAL RATE: 82 BPM
BACTERIA URNS QL MICRO: ABNORMAL /HPF
BASOPHILS # BLD: 0 K/UL (ref 0–0.1)
BASOPHILS NFR BLD: 0 % (ref 0–1)
BILIRUB SERPL-MCNC: 0.2 MG/DL (ref 0.2–1)
BILIRUB UR QL: NEGATIVE
BNP SERPL-MCNC: 566 PG/ML
BUN SERPL-MCNC: 9 MG/DL (ref 6–20)
BUN/CREAT SERPL: 13 (ref 12–20)
CA-I BLD-MCNC: 8.9 MG/DL (ref 8.5–10.1)
CALCULATED P AXIS, ECG09: 36 DEGREES
CALCULATED R AXIS, ECG10: 9 DEGREES
CALCULATED T AXIS, ECG11: 36 DEGREES
CHLORIDE SERPL-SCNC: 105 MMOL/L (ref 97–108)
CO2 SERPL-SCNC: 28 MMOL/L (ref 21–32)
COLOR UR: YELLOW
CREAT SERPL-MCNC: 0.7 MG/DL (ref 0.55–1.02)
DIAGNOSIS, 93000: NORMAL
DIFFERENTIAL METHOD BLD: ABNORMAL
EOSINOPHIL # BLD: 0.2 K/UL (ref 0–0.4)
EOSINOPHIL NFR BLD: 2 % (ref 0–7)
EPITH CASTS URNS QL MICRO: ABNORMAL /LPF
ERYTHROCYTE [DISTWIDTH] IN BLOOD BY AUTOMATED COUNT: 14.5 % (ref 11.5–14.5)
GLOBULIN SER CALC-MCNC: 3.9 G/DL (ref 2–4)
GLUCOSE SERPL-MCNC: 104 MG/DL (ref 65–100)
GLUCOSE UR STRIP.AUTO-MCNC: NEGATIVE MG/DL
HCT VFR BLD AUTO: 32 % (ref 35–47)
HGB BLD-MCNC: 10.4 G/DL (ref 11.5–16)
HGB UR QL STRIP: ABNORMAL
IMM GRANULOCYTES # BLD AUTO: 0 K/UL (ref 0–0.04)
IMM GRANULOCYTES NFR BLD AUTO: 0 % (ref 0–0.5)
KETONES UR QL STRIP.AUTO: NEGATIVE MG/DL
LDH SERPL L TO P-CCNC: 275 U/L (ref 81–246)
LEUKOCYTE ESTERASE UR QL STRIP.AUTO: ABNORMAL
LYMPHOCYTES # BLD: 1.8 K/UL (ref 0.8–3.5)
LYMPHOCYTES NFR BLD: 20 % (ref 12–49)
MAGNESIUM SERPL-MCNC: 1.6 MG/DL (ref 1.6–2.4)
MCH RBC QN AUTO: 28.3 PG (ref 26–34)
MCHC RBC AUTO-ENTMCNC: 32.5 G/DL (ref 30–36.5)
MCV RBC AUTO: 87.2 FL (ref 80–99)
MONOCYTES # BLD: 0.8 K/UL (ref 0–1)
MONOCYTES NFR BLD: 9 % (ref 5–13)
NEUTS SEG # BLD: 6.1 K/UL (ref 1.8–8)
NEUTS SEG NFR BLD: 69 % (ref 32–75)
NITRITE UR QL STRIP.AUTO: NEGATIVE
P-R INTERVAL, ECG05: 130 MS
PH UR STRIP: 5 (ref 5–8)
PLATELET # BLD AUTO: 259 K/UL (ref 150–400)
PMV BLD AUTO: 10.5 FL (ref 8.9–12.9)
POTASSIUM SERPL-SCNC: 3.5 MMOL/L (ref 3.5–5.1)
PROT SERPL-MCNC: 6.5 G/DL (ref 6.4–8.2)
PROT UR STRIP-MCNC: NEGATIVE MG/DL
Q-T INTERVAL, ECG07: 372 MS
QRS DURATION, ECG06: 66 MS
QTC CALCULATION (BEZET), ECG08: 434 MS
RBC # BLD AUTO: 3.67 M/UL (ref 3.8–5.2)
RBC #/AREA URNS HPF: ABNORMAL /HPF (ref 0–5)
SODIUM SERPL-SCNC: 140 MMOL/L (ref 136–145)
SP GR UR REFRACTOMETRY: 1.01 (ref 1–1.03)
UA: UC IF INDICATED,UAUC: ABNORMAL
UROBILINOGEN UR QL STRIP.AUTO: 0.1 EU/DL (ref 0.2–1)
VENTRICULAR RATE, ECG03: 82 BPM
WBC # BLD AUTO: 9 K/UL (ref 3.6–11)
WBC URNS QL MICRO: ABNORMAL /HPF (ref 0–4)

## 2023-01-05 PROCEDURE — 96374 THER/PROPH/DIAG INJ IV PUSH: CPT

## 2023-01-05 PROCEDURE — 74011000250 HC RX REV CODE- 250: Performed by: STUDENT IN AN ORGANIZED HEALTH CARE EDUCATION/TRAINING PROGRAM

## 2023-01-05 PROCEDURE — 81001 URINALYSIS AUTO W/SCOPE: CPT

## 2023-01-05 PROCEDURE — 87186 SC STD MICRODIL/AGAR DIL: CPT

## 2023-01-05 PROCEDURE — 96375 TX/PRO/DX INJ NEW DRUG ADDON: CPT

## 2023-01-05 PROCEDURE — 74011250636 HC RX REV CODE- 250/636

## 2023-01-05 PROCEDURE — 74011250636 HC RX REV CODE- 250/636: Performed by: STUDENT IN AN ORGANIZED HEALTH CARE EDUCATION/TRAINING PROGRAM

## 2023-01-05 PROCEDURE — 83615 LACTATE (LD) (LDH) ENZYME: CPT

## 2023-01-05 PROCEDURE — 83880 ASSAY OF NATRIURETIC PEPTIDE: CPT

## 2023-01-05 PROCEDURE — 96376 TX/PRO/DX INJ SAME DRUG ADON: CPT

## 2023-01-05 PROCEDURE — 80053 COMPREHEN METABOLIC PANEL: CPT

## 2023-01-05 PROCEDURE — 51702 INSERT TEMP BLADDER CATH: CPT

## 2023-01-05 PROCEDURE — 74011250637 HC RX REV CODE- 250/637: Performed by: STUDENT IN AN ORGANIZED HEALTH CARE EDUCATION/TRAINING PROGRAM

## 2023-01-05 PROCEDURE — G0378 HOSPITAL OBSERVATION PER HR: HCPCS

## 2023-01-05 PROCEDURE — 87086 URINE CULTURE/COLONY COUNT: CPT

## 2023-01-05 PROCEDURE — 74011000258 HC RX REV CODE- 258: Performed by: STUDENT IN AN ORGANIZED HEALTH CARE EDUCATION/TRAINING PROGRAM

## 2023-01-05 PROCEDURE — 85025 COMPLETE CBC W/AUTO DIFF WBC: CPT

## 2023-01-05 PROCEDURE — 74011250637 HC RX REV CODE- 250/637: Performed by: OBSTETRICS & GYNECOLOGY

## 2023-01-05 PROCEDURE — 74011250636 HC RX REV CODE- 250/636: Performed by: OBSTETRICS & GYNECOLOGY

## 2023-01-05 PROCEDURE — 87077 CULTURE AEROBIC IDENTIFY: CPT

## 2023-01-05 PROCEDURE — 83735 ASSAY OF MAGNESIUM: CPT

## 2023-01-05 RX ORDER — SODIUM CHLORIDE 0.9 % (FLUSH) 0.9 %
5-40 SYRINGE (ML) INJECTION EVERY 8 HOURS
Status: DISCONTINUED | OUTPATIENT
Start: 2023-01-05 | End: 2023-01-09 | Stop reason: HOSPADM

## 2023-01-05 RX ORDER — SODIUM CHLORIDE 0.9 % (FLUSH) 0.9 %
5-40 SYRINGE (ML) INJECTION AS NEEDED
Status: DISCONTINUED | OUTPATIENT
Start: 2023-01-05 | End: 2023-01-09 | Stop reason: HOSPADM

## 2023-01-05 RX ORDER — MAGNESIUM SULFATE HEPTAHYDRATE 40 MG/ML
2 INJECTION, SOLUTION INTRAVENOUS CONTINUOUS
Status: DISCONTINUED | OUTPATIENT
Start: 2023-01-06 | End: 2023-01-07

## 2023-01-05 RX ORDER — ACETAMINOPHEN 650 MG/1
650 SUPPOSITORY RECTAL
Status: DISCONTINUED | OUTPATIENT
Start: 2023-01-05 | End: 2023-01-09 | Stop reason: HOSPADM

## 2023-01-05 RX ORDER — IBUPROFEN 800 MG/1
800 TABLET ORAL
Status: DISCONTINUED | OUTPATIENT
Start: 2023-01-05 | End: 2023-01-09 | Stop reason: HOSPADM

## 2023-01-05 RX ORDER — SODIUM CHLORIDE, SODIUM LACTATE, POTASSIUM CHLORIDE, CALCIUM CHLORIDE 600; 310; 30; 20 MG/100ML; MG/100ML; MG/100ML; MG/100ML
75 INJECTION, SOLUTION INTRAVENOUS CONTINUOUS
Status: DISCONTINUED | OUTPATIENT
Start: 2023-01-05 | End: 2023-01-07

## 2023-01-05 RX ORDER — MAGNESIUM SULFATE HEPTAHYDRATE 40 MG/ML
2 INJECTION, SOLUTION INTRAVENOUS ONCE
Status: COMPLETED | OUTPATIENT
Start: 2023-01-05 | End: 2023-01-05

## 2023-01-05 RX ORDER — HYDRALAZINE HYDROCHLORIDE 20 MG/ML
10 INJECTION INTRAMUSCULAR; INTRAVENOUS ONCE
Status: COMPLETED | OUTPATIENT
Start: 2023-01-05 | End: 2023-01-05

## 2023-01-05 RX ORDER — LABETALOL HCL 20 MG/4 ML
10 SYRINGE (ML) INTRAVENOUS ONCE
Status: COMPLETED | OUTPATIENT
Start: 2023-01-05 | End: 2023-01-05

## 2023-01-05 RX ORDER — MAGNESIUM SULFATE HEPTAHYDRATE 40 MG/ML
2 INJECTION, SOLUTION INTRAVENOUS
Status: COMPLETED | OUTPATIENT
Start: 2023-01-05 | End: 2023-01-05

## 2023-01-05 RX ORDER — MAGNESIUM SULFATE HEPTAHYDRATE 40 MG/ML
INJECTION, SOLUTION INTRAVENOUS
Status: COMPLETED
Start: 2023-01-05 | End: 2023-01-05

## 2023-01-05 RX ORDER — LABETALOL 200 MG/1
200 TABLET, FILM COATED ORAL EVERY 12 HOURS
Status: DISCONTINUED | OUTPATIENT
Start: 2023-01-05 | End: 2023-01-06

## 2023-01-05 RX ORDER — ONDANSETRON 4 MG/1
4 TABLET, ORALLY DISINTEGRATING ORAL
Status: DISCONTINUED | OUTPATIENT
Start: 2023-01-05 | End: 2023-01-09 | Stop reason: HOSPADM

## 2023-01-05 RX ORDER — LABETALOL HYDROCHLORIDE 5 MG/ML
10 INJECTION, SOLUTION INTRAVENOUS
Status: COMPLETED | OUTPATIENT
Start: 2023-01-05 | End: 2023-01-05

## 2023-01-05 RX ORDER — CALCIUM GLUCONATE 20 MG/ML
1 INJECTION, SOLUTION INTRAVENOUS AS NEEDED
Status: DISCONTINUED | OUTPATIENT
Start: 2023-01-05 | End: 2023-01-09 | Stop reason: HOSPADM

## 2023-01-05 RX ORDER — OXYCODONE AND ACETAMINOPHEN 5; 325 MG/1; MG/1
1 TABLET ORAL
Status: DISCONTINUED | OUTPATIENT
Start: 2023-01-05 | End: 2023-01-09 | Stop reason: HOSPADM

## 2023-01-05 RX ORDER — LABETALOL HCL 20 MG/4 ML
20 SYRINGE (ML) INTRAVENOUS ONCE
Status: DISCONTINUED | OUTPATIENT
Start: 2023-01-05 | End: 2023-01-05

## 2023-01-05 RX ORDER — LABETALOL 100 MG/1
200 TABLET, FILM COATED ORAL
Status: COMPLETED | OUTPATIENT
Start: 2023-01-05 | End: 2023-01-05

## 2023-01-05 RX ORDER — MAGNESIUM SULFATE HEPTAHYDRATE 40 MG/ML
2 INJECTION, SOLUTION INTRAVENOUS CONTINUOUS
Status: DISCONTINUED | OUTPATIENT
Start: 2023-01-05 | End: 2023-01-05 | Stop reason: SDUPTHER

## 2023-01-05 RX ORDER — SODIUM CHLORIDE, SODIUM LACTATE, POTASSIUM CHLORIDE, CALCIUM CHLORIDE 600; 310; 30; 20 MG/100ML; MG/100ML; MG/100ML; MG/100ML
125 INJECTION, SOLUTION INTRAVENOUS CONTINUOUS
Status: DISCONTINUED | OUTPATIENT
Start: 2023-01-05 | End: 2023-01-07

## 2023-01-05 RX ORDER — ACETAMINOPHEN 325 MG/1
650 TABLET ORAL
Status: DISCONTINUED | OUTPATIENT
Start: 2023-01-05 | End: 2023-01-09 | Stop reason: HOSPADM

## 2023-01-05 RX ORDER — LABETALOL HYDROCHLORIDE 5 MG/ML
20 INJECTION, SOLUTION INTRAVENOUS
Status: COMPLETED | OUTPATIENT
Start: 2023-01-05 | End: 2023-01-05

## 2023-01-05 RX ORDER — ONDANSETRON 2 MG/ML
4 INJECTION INTRAMUSCULAR; INTRAVENOUS
Status: DISCONTINUED | OUTPATIENT
Start: 2023-01-05 | End: 2023-01-09 | Stop reason: HOSPADM

## 2023-01-05 RX ADMIN — SODIUM CHLORIDE, POTASSIUM CHLORIDE, SODIUM LACTATE AND CALCIUM CHLORIDE 75 ML/HR: 600; 310; 30; 20 INJECTION, SOLUTION INTRAVENOUS at 23:06

## 2023-01-05 RX ADMIN — HYDRALAZINE HYDROCHLORIDE 10 MG: 20 INJECTION INTRAMUSCULAR; INTRAVENOUS at 09:39

## 2023-01-05 RX ADMIN — LABETALOL HYDROCHLORIDE 200 MG: 200 TABLET, FILM COATED ORAL at 11:14

## 2023-01-05 RX ADMIN — MAGNESIUM SULFATE HEPTAHYDRATE 2 G/HR: 40 INJECTION, SOLUTION INTRAVENOUS at 09:29

## 2023-01-05 RX ADMIN — SODIUM CHLORIDE, POTASSIUM CHLORIDE, SODIUM LACTATE AND CALCIUM CHLORIDE 75 ML/HR: 600; 310; 30; 20 INJECTION, SOLUTION INTRAVENOUS at 09:28

## 2023-01-05 RX ADMIN — MAGNESIUM SULFATE HEPTAHYDRATE 2 G: 40 INJECTION, SOLUTION INTRAVENOUS at 06:55

## 2023-01-05 RX ADMIN — MAGNESIUM SULFATE HEPTAHYDRATE 2 G/HR: 40 INJECTION, SOLUTION INTRAVENOUS at 19:27

## 2023-01-05 RX ADMIN — ACETAMINOPHEN 650 MG: 325 TABLET ORAL at 15:10

## 2023-01-05 RX ADMIN — LABETALOL HYDROCHLORIDE 10 MG: 5 INJECTION, SOLUTION INTRAVENOUS at 06:51

## 2023-01-05 RX ADMIN — LABETALOL HYDROCHLORIDE 200 MG: 100 TABLET, FILM COATED ORAL at 00:15

## 2023-01-05 RX ADMIN — LABETALOL HYDROCHLORIDE 10 MG: 5 INJECTION, SOLUTION INTRAVENOUS at 01:24

## 2023-01-05 RX ADMIN — MAGNESIUM SULFATE HEPTAHYDRATE 4 G: 500 INJECTION, SOLUTION INTRAMUSCULAR; INTRAVENOUS at 02:39

## 2023-01-05 RX ADMIN — MAGNESIUM SULFATE HEPTAHYDRATE 2 G: 40 INJECTION, SOLUTION INTRAVENOUS at 03:46

## 2023-01-05 RX ADMIN — MAGNESIUM SULFATE HEPTAHYDRATE 2 G: 40 INJECTION, SOLUTION INTRAVENOUS at 07:46

## 2023-01-05 RX ADMIN — LABETALOL HYDROCHLORIDE 200 MG: 200 TABLET, FILM COATED ORAL at 20:46

## 2023-01-05 RX ADMIN — MAGNESIUM SULFATE HEPTAHYDRATE 2 G: 40 INJECTION, SOLUTION INTRAVENOUS at 05:47

## 2023-01-05 RX ADMIN — MAGNESIUM SULFATE HEPTAHYDRATE 2 G: 40 INJECTION, SOLUTION INTRAVENOUS at 04:47

## 2023-01-05 RX ADMIN — LABETALOL HYDROCHLORIDE 20 MG: 5 INJECTION, SOLUTION INTRAVENOUS at 02:39

## 2023-01-05 RX ADMIN — OXYCODONE AND ACETAMINOPHEN 1 TABLET: 5; 325 TABLET ORAL at 20:46

## 2023-01-05 NOTE — ED PROVIDER NOTES
EMERGENCY DEPARTMENT HISTORY AND PHYSICAL EXAM      Date: 2023  Patient Name: Sancho Dunne    History of Presenting Illness     Chief Complaint   Patient presents with    Leg Swelling     Bilateral      Pregnancy Problem     Possible pre-eclampsia       History Provided By: Patient    HPI: Sancho Dunne, 42 y.o. female with a past medical history significant  hypertension, recent delivery  presents to the ED with cc of lower extremity swelling. Patient states that she had an uncomplicated  delivery 6 days ago, since that time has noticed slight worsening of lower extremity swelling. Patient states she has a history of hypertension and dependent edema before and during pregnancy, was taken off of her diuretic during pregnancy, states that the lower extremity edema has worsened since that time. Patient denies any chest pain, shortness of breath, denies any headache, blurry vision double vision, no history of preeclampsia. Patient has not taken her evening dose of labetalol 200 mg. There are no other complaints, changes, or physical findings at this time. PCP: None    No current facility-administered medications on file prior to encounter. Current Outpatient Medications on File Prior to Encounter   Medication Sig Dispense Refill    oxyCODONE IR (ROXICODONE) 5 mg immediate release tablet Take 1 Tablet by mouth every four (4) hours as needed for Pain for up to 14 days. Max Daily Amount: 30 mg. 15 Tablet 0    ibuprofen (MOTRIN) 800 mg tablet Take 1 Tablet by mouth every eight (8) hours as needed for Pain. 30 Tablet 1    oxyCODONE-acetaminophen (PERCOCET) 5-325 mg per tablet Take 1 Tablet by mouth every four (4) hours as needed for Pain for up to 14 days. Max Daily Amount: 6 Tablets.  20 Tablet 0    labetaloL (NORMODYNE) 200 mg tablet Take 1 tablet by mouth twice daily 60 Tablet 3    omeprazole (PRILOSEC) 40 mg capsule 1 capsule      prenatal vitamin (Prenatal Multivitamins) 28 mg iron- 800 mcg tab tablet Take 1 Tablet by mouth daily. 90 Tablet 3    aspirin delayed-release 81 mg tablet Take 1 Tablet by mouth daily. (Patient not taking: No sig reported) 90 Tablet 3       Past History     Past Medical History:  Past Medical History:   Diagnosis Date    Anxiety     GERD (gastroesophageal reflux disease)     Headache     Hypercholesterolemia     Hypertension     Obesity     Phobia     Psychiatric disorder        Past Surgical History:  Past Surgical History:   Procedure Laterality Date    HX TONSIL AND ADENOIDECTOMY      CT CAUTERY CERVIX CRYOCAUTERY INITIAL/REPEAT         Family History:  Family History   Problem Relation Age of Onset    Diabetes Maternal Grandmother     Hypertension Maternal Grandmother     Diabetes Paternal Grandmother     Hypertension Paternal Grandmother        Social History:  Social History     Tobacco Use    Smoking status: Former    Smokeless tobacco: Never   Vaping Use    Vaping Use: Never used   Substance Use Topics    Alcohol use: Not Currently    Drug use: Never       Allergies:  No Known Allergies      Review of Systems     Review of Systems   Constitutional:  Negative for activity change, chills, fatigue and fever. HENT:  Negative for congestion and trouble swallowing. Eyes:  Negative for photophobia and visual disturbance. Respiratory:  Negative for cough, chest tightness and shortness of breath. Cardiovascular:  Positive for leg swelling. Negative for chest pain and palpitations. Gastrointestinal:  Negative for abdominal distention, abdominal pain, diarrhea, nausea and vomiting. Genitourinary:  Negative for dysuria, flank pain and frequency. Musculoskeletal:  Negative for arthralgias, back pain and myalgias. Skin:  Negative for color change, pallor and rash. Neurological:  Negative for dizziness, tremors, weakness and headaches. Psychiatric/Behavioral:  Negative for confusion. Physical Exam     Physical Exam  Vitals and nursing note reviewed. Constitutional:       General: She is not in acute distress. Appearance: Normal appearance. She is normal weight. She is not ill-appearing. HENT:      Head: Normocephalic and atraumatic. Nose: Nose normal.      Mouth/Throat:      Mouth: Mucous membranes are moist.   Eyes:      Extraocular Movements: Extraocular movements intact. Pupils: Pupils are equal, round, and reactive to light. Cardiovascular:      Rate and Rhythm: Normal rate and regular rhythm. Pulses: Normal pulses. Pulmonary:      Effort: Pulmonary effort is normal.   Abdominal:      General: Abdomen is flat. Bowel sounds are normal.      Palpations: Abdomen is soft. Tenderness: There is no abdominal tenderness. There is no guarding. Musculoskeletal:         General: No tenderness. Normal range of motion. Cervical back: Normal range of motion and neck supple. No muscular tenderness. Right lower leg: Edema present. Left lower leg: Edema present. Comments: 2+ pitting edema to bilateral lower extremities up to mid tibia   Skin:     General: Skin is warm and dry. Neurological:      General: No focal deficit present. Mental Status: She is alert and oriented to person, place, and time. Sensory: No sensory deficit. Motor: No weakness.        Diagnostic Study Results     Labs -     Recent Results (from the past 12 hour(s))   EKG, 12 LEAD, INITIAL    Collection Time: 01/04/23 11:56 PM   Result Value Ref Range    Ventricular Rate 82 BPM    Atrial Rate 82 BPM    P-R Interval 130 ms    QRS Duration 66 ms    Q-T Interval 372 ms    QTC Calculation (Bezet) 434 ms    Calculated P Axis 36 degrees    Calculated R Axis 9 degrees    Calculated T Axis 36 degrees    Diagnosis       Sinus rhythm with Fusion complexes  Low voltage QRS  Cannot rule out Anterior infarct (cited on or before 07-MAR-2022)  Abnormal ECG  When compared with ECG of 07-MAR-2022 00:06,  Fusion complexes are now Present  Questionable change in initial forces of Septal leads     URINALYSIS W/ REFLEX CULTURE    Collection Time: 01/05/23 12:00 AM    Specimen: Urine   Result Value Ref Range    Color Yellow      Appearance Hazy (A) Clear      Specific gravity 1.010 1.003 - 1.030      pH (UA) 5.0 5.0 - 8.0      Protein Negative Negative mg/dL    Glucose Negative Negative mg/dL    Ketone Negative Negative mg/dL    Bilirubin Negative Negative      Blood Large (A) Negative      Urobilinogen 0.1 (L) 0.2 - 1.0 EU/dL    Nitrites Negative Negative      Leukocyte Esterase Large (A) Negative      WBC 10-20 0 - 4 /hpf    RBC 5-10 0 - 5 /hpf    Epithelial cells Few Few /lpf    Bacteria 3+ (A) Negative /hpf    UA:UC IF INDICATED Urine Culture Ordered (A) Culture not indicated by UA result     CBC WITH AUTOMATED DIFF    Collection Time: 01/05/23 12:00 AM   Result Value Ref Range    WBC 9.0 3.6 - 11.0 K/uL    RBC 3.67 (L) 3.80 - 5.20 M/uL    HGB 10.4 (L) 11.5 - 16.0 g/dL    HCT 32.0 (L) 35.0 - 47.0 %    MCV 87.2 80.0 - 99.0 FL    MCH 28.3 26.0 - 34.0 PG    MCHC 32.5 30.0 - 36.5 g/dL    RDW 14.5 11.5 - 14.5 %    PLATELET 014 459 - 466 K/uL    MPV 10.5 8.9 - 12.9 FL    NEUTROPHILS 69 32 - 75 %    LYMPHOCYTES 20 12 - 49 %    MONOCYTES 9 5 - 13 %    EOSINOPHILS 2 0 - 7 %    BASOPHILS 0 0 - 1 %    IMMATURE GRANULOCYTES 0 0.0 - 0.5 %    ABS. NEUTROPHILS 6.1 1.8 - 8.0 K/UL    ABS. LYMPHOCYTES 1.8 0.8 - 3.5 K/UL    ABS. MONOCYTES 0.8 0.0 - 1.0 K/UL    ABS. EOSINOPHILS 0.2 0.0 - 0.4 K/UL    ABS. BASOPHILS 0.0 0.0 - 0.1 K/UL    ABS. IMM.  GRANS. 0.0 0.00 - 0.04 K/UL    DF AUTOMATED     METABOLIC PANEL, COMPREHENSIVE    Collection Time: 01/05/23 12:00 AM   Result Value Ref Range    Sodium 140 136 - 145 mmol/L    Potassium 3.5 3.5 - 5.1 mmol/L    Chloride 105 97 - 108 mmol/L    CO2 28 21 - 32 mmol/L    Anion gap 7 5 - 15 mmol/L    Glucose 104 (H) 65 - 100 mg/dL    BUN 9 6 - 20 mg/dL    Creatinine 0.70 0.55 - 1.02 mg/dL    BUN/Creatinine ratio 13 12 - 20      eGFR >60 >60 ml/min/1.73m2    Calcium 8.9 8.5 - 10.1 mg/dL    Bilirubin, total 0.2 0.2 - 1.0 mg/dL    AST (SGOT) 60 (H) 15 - 37 U/L    ALT (SGPT) 61 12 - 78 U/L    Alk. phosphatase 144 (H) 45 - 117 U/L    Protein, total 6.5 6.4 - 8.2 g/dL    Albumin 2.6 (L) 3.5 - 5.0 g/dL    Globulin 3.9 2.0 - 4.0 g/dL    A-G Ratio 0.7 (L) 1.1 - 2.2     MAGNESIUM    Collection Time: 23 12:00 AM   Result Value Ref Range    Magnesium 1.6 1.6 - 2.4 mg/dL   NT-PRO BNP    Collection Time: 23 12:00 AM   Result Value Ref Range    NT pro- (H) <125 pg/mL       Radiologic Studies -   @lastxrresult@  CT Results  (Last 48 hours)      None          CXR Results  (Last 48 hours)      None              Medical Decision Making   I am the first provider for this patient. I reviewed the vital signs, available nursing notes, past medical history, past surgical history, family history and social history. Vital Signs-Reviewed the patient's vital signs. Patient Vitals for the past 12 hrs:   Temp Pulse Resp BP SpO2   23 0121 -- 75 16 (!) 184/99 100 %   23 0106 -- 78 17 (!) 184/100 100 %   23 0046 -- 72 17 (!) 177/103 100 %   23 0013 -- 76 17 (!) 179/96 99 %   23 0001 -- 76 20 (!) 170/105 100 %   23 2338 98.3 °F (36.8 °C) 82 20 (!) 172/104 100 %     EKG: Completed , interpreted by myself at 00 20, normal sinus rhythm 82, no ST elevation, isolated PVC noted, no LVH    Records Reviewed: Nursing Notes, old chart    The patient presents with lower extremity swelling with a differential diagnosis of dependent edema, preeclampsia, heart failure, DVT      Provider Notes (Medical Decision Making):     MDM     33-year-old female history of hypertension, dependent edema, recent  delivery, 5 days ago, presents emergency department for bilateral lower extremity swelling.     Physical exam shows well-appearing female no distress, noted hypertension on arrival 170/100, bilateral lower extremity swelling, 2+ mid tibial pitting edema. Patient denies any other signs symptoms of preeclampsia, no headache, blurry vision, chest pain shortness of breath. Will obtain basic lab work including urinalysis, administer patient's p.o. dose of labetalol, 200 mg, continue to monitor patient. ED Course:   Initial assessment performed. The patients presenting problems have been discussed, and they are in agreement with the care plan formulated and outlined with them. I have encouraged them to ask questions as they arise throughout their visit. ED Course as of 01/05/23 0229   Thu Jan 05, 2023   0031 Protein: Negative [PZ]   2794 Patient's p.o. dose labetalol did not bring down blood pressure last check 184/100. Will give patient 10 mg IV labetalol. [PZ]   0208 IV labetalol did not significantly lower BP, last check 179/94, I discussed case with Dr. Anne Weber, recommend doubling IV labetalol dose to 20 mg, additionally recommends initiating magnesium infusion, start with 4 g bolus and 2 g an hour drip.   Request transfer to L&D at [facility]. [PZ]      ED Course User Index  [PZ] Sarthak Winston MD         PROCEDURES  Critical Care  Performed by: Sarthak Winston MD  Authorized by: Sarthak Winston MD     Critical care provider statement:     Critical care time (minutes):  60    Critical care time was exclusive of:  Separately billable procedures and treating other patients    Critical care was necessary to treat or prevent imminent or life-threatening deterioration of the following conditions:  Circulatory failure and cardiac failure    Critical care was time spent personally by me on the following activities:  Blood draw for specimens, discussions with consultants, evaluation of patient's response to treatment, examination of patient, obtaining history from patient or surrogate, ordering and performing treatments and interventions, ordering and review of laboratory studies, re-evaluation of patient's condition and review of old charts    Care discussed with: accepting provider at another facility           PLAN:  1. Current Discharge Medication List        2. Follow-up Information    None       Return to ED if worse     Diagnosis     Clinical Impression:   1. Hypertension, postpartum condition or complication    2.  Lower extremity edema

## 2023-01-05 NOTE — PROGRESS NOTES
0830 Patient sent from [facility] ED with complaints of high blood pressure swelling since delivery of infant on 12/30. States that swelling and pain has gotten worse. Patient delivered via csection on 12/30. Patient blood pressures at [facility] 's/90's. Patient received Labetelol x 2 IV and once po at ED. Patient also received Magnesium 4gm bolus and 2gm per hour. Blood pressures on arrival to unit  was 180's/90's. Patient has picot dressing on with small amount of old dark blood noted on dressing. Vaginal bleeding normal for postpartum period. Dr. Maday Andrade aware of patient arrival. Orders received . See flowsheet for charting.

## 2023-01-05 NOTE — ED TRIAGE NOTES
Pt presents with bilateral leg and feet swelling. Pt is post-partum, delivering by  on 2022. Pts OB/GYN is Dr. Tyree Medeiros.

## 2023-01-06 PROCEDURE — 65410000002 HC RM PRIVATE OB

## 2023-01-06 PROCEDURE — G0378 HOSPITAL OBSERVATION PER HR: HCPCS

## 2023-01-06 PROCEDURE — 74011250637 HC RX REV CODE- 250/637: Performed by: OBSTETRICS & GYNECOLOGY

## 2023-01-06 RX ORDER — LABETALOL 200 MG/1
400 TABLET, FILM COATED ORAL 3 TIMES DAILY
Status: DISCONTINUED | OUTPATIENT
Start: 2023-01-06 | End: 2023-01-06

## 2023-01-06 RX ORDER — LABETALOL 100 MG/1
100 TABLET, FILM COATED ORAL
Status: COMPLETED | OUTPATIENT
Start: 2023-01-06 | End: 2023-01-06

## 2023-01-06 RX ORDER — NIFEDIPINE 30 MG/1
30 TABLET, EXTENDED RELEASE ORAL DAILY
Status: DISCONTINUED | OUTPATIENT
Start: 2023-01-06 | End: 2023-01-06

## 2023-01-06 RX ORDER — METOPROLOL TARTRATE 25 MG/1
25 TABLET, FILM COATED ORAL ONCE
Status: COMPLETED | OUTPATIENT
Start: 2023-01-06 | End: 2023-01-06

## 2023-01-06 RX ORDER — METOPROLOL TARTRATE 25 MG/1
25 TABLET, FILM COATED ORAL 3 TIMES DAILY
Status: DISCONTINUED | OUTPATIENT
Start: 2023-01-06 | End: 2023-01-07

## 2023-01-06 RX ORDER — LABETALOL 200 MG/1
400 TABLET, FILM COATED ORAL EVERY 8 HOURS
Status: DISCONTINUED | OUTPATIENT
Start: 2023-01-06 | End: 2023-01-06

## 2023-01-06 RX ADMIN — NIFEDIPINE 30 MG: 30 TABLET, FILM COATED, EXTENDED RELEASE ORAL at 10:20

## 2023-01-06 RX ADMIN — METOPROLOL TARTRATE 25 MG: 25 TABLET, FILM COATED ORAL at 11:06

## 2023-01-06 RX ADMIN — LABETALOL HYDROCHLORIDE 100 MG: 100 TABLET, FILM COATED ORAL at 07:36

## 2023-01-06 RX ADMIN — LABETALOL HYDROCHLORIDE 300 MG: 200 TABLET, FILM COATED ORAL at 05:14

## 2023-01-06 RX ADMIN — OXYCODONE AND ACETAMINOPHEN 1 TABLET: 5; 325 TABLET ORAL at 13:58

## 2023-01-06 RX ADMIN — OXYCODONE AND ACETAMINOPHEN 1 TABLET: 5; 325 TABLET ORAL at 09:04

## 2023-01-06 RX ADMIN — HYDROCHLOROTHIAZIDE: 25 TABLET ORAL at 12:37

## 2023-01-06 RX ADMIN — IBUPROFEN 800 MG: 800 TABLET, FILM COATED ORAL at 09:04

## 2023-01-06 RX ADMIN — METOPROLOL TARTRATE 25 MG: 25 TABLET, FILM COATED ORAL at 16:47

## 2023-01-06 RX ADMIN — ACETAMINOPHEN 650 MG: 325 TABLET ORAL at 04:19

## 2023-01-06 RX ADMIN — OXYCODONE AND ACETAMINOPHEN 1 TABLET: 5; 325 TABLET ORAL at 20:11

## 2023-01-06 NOTE — PROGRESS NOTES
TRANSFER - IN REPORT:    Verbal report received from Dasia Abel RN on Indra Leblanc  being received from Labor and Delivery unit. Report consisted of patients Situation, Background, Assessment and   Recommendations(SBAR). Opportunity for questions and clarification was provided. Assessment completed upon patients arrival to unit and care assumed.

## 2023-01-06 NOTE — PROGRESS NOTES
8981 Patient sitting up in chair. No complaints at this time. 100mg dose of labetalol given to increase dose to 400mg tid. Will continue to monitor blood pressures      0817 Picot dressing removed. Steri strips intact. Wound intact with no drainage at present time.

## 2023-01-06 NOTE — PROGRESS NOTES
Progress Note    Patient: Robert Crowder MRN: [id number]  SSN: [id number]    YOB: 1985  Age: 42 y.o. Sex: female      Admit Date: 1/4/2023    LOS: 0 days     Subjective:     No Complaints    Objective:     Vitals:    01/06/23 0816 01/06/23 0830 01/06/23 0900 01/06/23 0930   BP: (!) 171/98 (!) 175/96 (!) 148/85 (!) 160/90   Pulse: 78 74 95 85   Resp:       Temp:       SpO2:       Weight:       Height:            Intake and Output:  Current Shift: No intake/output data recorded. Last three shifts: 01/04 1901 - 01/06 0700  In: -   Out: 4051 [Urine:4040]    Physical Exam:   Abd:  FF  INC: CDI    Lab/Data Review: All lab results for the last 24 hours reviewed.          Assessment:     Active Problems:    Hypertension (1/5/2023)        Plan:     Routine PP/PO orders  Change to previous BP meds      Signed By: Sanjana Camarena MD     January 6, 2023

## 2023-01-06 NOTE — PROGRESS NOTES
1915: Bedside shift report received from Noah Amezquita RN. Pt resting comfortably in bed at this time with no complaints. 0240: Magnesium and IV fluids stopped at this time per MD order. 0400: Bullock removed at this time and pt ambulated to bathroom. 0503: Call placed to Dr. Aniket Moses regarding pt's BP's. Order received for one time dose of Labetalol 300 mg PO.     0705: Shift report given to S. Severa Novak, RN.

## 2023-01-07 ENCOUNTER — APPOINTMENT (OUTPATIENT)
Dept: GENERAL RADIOLOGY | Age: 38
End: 2023-01-07
Attending: OBSTETRICS & GYNECOLOGY
Payer: COMMERCIAL

## 2023-01-07 LAB
ATRIAL RATE: 83 BPM
CALCULATED P AXIS, ECG09: 16 DEGREES
CALCULATED R AXIS, ECG10: -3 DEGREES
CALCULATED T AXIS, ECG11: 41 DEGREES
DIAGNOSIS, 93000: NORMAL
P-R INTERVAL, ECG05: 130 MS
Q-T INTERVAL, ECG07: 384 MS
QRS DURATION, ECG06: 70 MS
QTC CALCULATION (BEZET), ECG08: 451 MS
VENTRICULAR RATE, ECG03: 83 BPM

## 2023-01-07 PROCEDURE — 65410000002 HC RM PRIVATE OB

## 2023-01-07 PROCEDURE — 74011000250 HC RX REV CODE- 250: Performed by: OBSTETRICS & GYNECOLOGY

## 2023-01-07 PROCEDURE — 71045 X-RAY EXAM CHEST 1 VIEW: CPT

## 2023-01-07 PROCEDURE — 93005 ELECTROCARDIOGRAM TRACING: CPT

## 2023-01-07 PROCEDURE — 74011250637 HC RX REV CODE- 250/637: Performed by: OBSTETRICS & GYNECOLOGY

## 2023-01-07 PROCEDURE — 93010 ELECTROCARDIOGRAM REPORT: CPT | Performed by: INTERNAL MEDICINE

## 2023-01-07 RX ORDER — FUROSEMIDE 40 MG/1
20 TABLET ORAL ONCE
Status: COMPLETED | OUTPATIENT
Start: 2023-01-07 | End: 2023-01-07

## 2023-01-07 RX ORDER — PANTOPRAZOLE SODIUM 40 MG/1
40 TABLET, DELAYED RELEASE ORAL DAILY
Status: DISCONTINUED | OUTPATIENT
Start: 2023-01-07 | End: 2023-01-09 | Stop reason: HOSPADM

## 2023-01-07 RX ORDER — METOPROLOL TARTRATE 25 MG/1
50 TABLET, FILM COATED ORAL 3 TIMES DAILY
Status: DISCONTINUED | OUTPATIENT
Start: 2023-01-07 | End: 2023-01-08

## 2023-01-07 RX ORDER — AMOXICILLIN AND CLAVULANATE POTASSIUM 500; 125 MG/1; MG/1
1 TABLET, FILM COATED ORAL EVERY 12 HOURS
Status: DISCONTINUED | OUTPATIENT
Start: 2023-01-07 | End: 2023-01-09 | Stop reason: HOSPADM

## 2023-01-07 RX ADMIN — AMOXICILLIN AND CLAVULANATE POTASSIUM 1 TABLET: 500; 125 TABLET, FILM COATED ORAL at 17:05

## 2023-01-07 RX ADMIN — METOPROLOL TARTRATE 25 MG: 25 TABLET, FILM COATED ORAL at 08:29

## 2023-01-07 RX ADMIN — METOPROLOL TARTRATE 50 MG: 25 TABLET, FILM COATED ORAL at 17:40

## 2023-01-07 RX ADMIN — IBUPROFEN 800 MG: 800 TABLET, FILM COATED ORAL at 06:03

## 2023-01-07 RX ADMIN — FUROSEMIDE 20 MG: 40 TABLET ORAL at 16:34

## 2023-01-07 RX ADMIN — HYDROCHLOROTHIAZIDE: 25 TABLET ORAL at 09:40

## 2023-01-07 RX ADMIN — PANTOPRAZOLE SODIUM 40 MG: 40 TABLET, DELAYED RELEASE ORAL at 11:36

## 2023-01-07 RX ADMIN — SODIUM CHLORIDE, PRESERVATIVE FREE 10 ML: 5 INJECTION INTRAVENOUS at 21:33

## 2023-01-07 RX ADMIN — METOPROLOL TARTRATE 50 MG: 25 TABLET, FILM COATED ORAL at 12:43

## 2023-01-07 RX ADMIN — OXYCODONE AND ACETAMINOPHEN 1 TABLET: 5; 325 TABLET ORAL at 06:04

## 2023-01-07 RX ADMIN — OXYCODONE AND ACETAMINOPHEN 1 TABLET: 5; 325 TABLET ORAL at 20:18

## 2023-01-07 RX ADMIN — OXYCODONE AND ACETAMINOPHEN 1 TABLET: 5; 325 TABLET ORAL at 02:12

## 2023-01-07 NOTE — PROGRESS NOTES
Progress Note    Patient: Idnra Leblanc MRN: [id number]  SSN: [id number]    YOB: 1985  Age: 42 y.o. Sex: female      Admit Date: 1/4/2023    LOS: 1 day     Subjective:     No Complaints, did note some chest discomfort that has resolved    Objective:     Vitals:    01/06/23 2020 01/06/23 2323 01/07/23 0440 01/07/23 0829   BP: (!) 155/91 (!) 153/90 (!) 153/86 (!) 164/99   Pulse: 99 89  85   Resp: 18 18  18   Temp: 98.3 °F (36.8 °C) 98 °F (36.7 °C)  98.5 °F (36.9 °C)   SpO2: 98% 97%  97%   Weight:       Height:            Intake and Output:  Current Shift: No intake/output data recorded. Last three shifts: 01/05 1901 - 01/07 0700  In: -   Out: 2140 [Urine:2140]    Physical Exam:   Abd:  FF      Lab/Data Review: All lab results for the last 24 hours reviewed. Assessment:     Active Problems:    Hypertension (1/5/2023)      Preeclampsia in postpartum period (1/7/2023)        Plan:     Increase Metoprolol dose to 50 mg TID  Cont.  Lisinopril/HCTZ at current dose    Signed By: Jeraldene Kocher, MD     January 7, 2023

## 2023-01-07 NOTE — PROGRESS NOTES
0900 Dr Simona English aware of BP this am of 164/99. Aware she is having slight discomfort to chest. BP medication was changed. 1100 Aware patient requesting something for heartburn. New order received. 1430 Dr Simona English aware patient complaining of chest tightness at this time and wheezing. Patient stated, \"do I hear that? \" Referring to her breathing. Lungs are clear. New order for 2d echo, ekg and chest xray. Aware /104.     1438 EKG tech at bedside. 1440 Dr Simona English aware EKG shows sinus rhythm with occasional premature ventricular complexes otherwise normal EKG. Also, aware 2-d echo can not be completed today. [address] at bedside    1620 Dr Simona English aware of chest xray results. Dr Chantel Malik spoke with patient regarding results. New orders received for Augmentin and Lasix times 1 dose. 1626 Incentive Spirometry given to patient and encouraged to use every one hour times 10 while awake. [id number] Patient was left alone with baby in room. Father of baby said he needed to go get his medication and will be back. Patient aware she has to have an adult present in room while baby is in the room. 550 [address] Dr Simona English aware BP remains high at  165/107. New order to consult hospitalist. [employer] message sent to DR Turner. Message shows as read, but he did not respond to message. Patient denies headache, epigastric pain or vision disturbances. No chest tightness.      Problem: Hypertension  Goal: *Blood pressure within specified parameters  Outcome: Progressing Towards Goal  Goal: *Fluid volume balance  Outcome: Progressing Towards Goal  Goal: *Labs within defined limits  Outcome: Progressing Towards Goal     Problem: Patient Education: Go to Patient Education Activity  Goal: Patient/Family Education  Outcome: Progressing Towards Goal     Problem: Pain  Goal: *Control of Pain  Outcome: Progressing Towards Goal     Problem: Patient Education: Go to Patient Education Activity  Goal: Patient/Family Education  Outcome: Progressing Towards Goal

## 2023-01-07 NOTE — PROGRESS NOTES
Jamaica Trevino from nurses station patient and significant other arguing and cursing. Enter pt room asked if everything ok , they said yes. Patient up in her room , instructed to stay in bed as much as possible due to high blood pressure .

## 2023-01-08 ENCOUNTER — APPOINTMENT (OUTPATIENT)
Dept: NON INVASIVE DIAGNOSTICS | Age: 38
End: 2023-01-08
Attending: OBSTETRICS & GYNECOLOGY
Payer: COMMERCIAL

## 2023-01-08 ENCOUNTER — APPOINTMENT (OUTPATIENT)
Dept: CT IMAGING | Age: 38
End: 2023-01-08
Attending: INTERNAL MEDICINE
Payer: COMMERCIAL

## 2023-01-08 ENCOUNTER — APPOINTMENT (OUTPATIENT)
Dept: NON INVASIVE DIAGNOSTICS | Age: 38
End: 2023-01-08
Attending: STUDENT IN AN ORGANIZED HEALTH CARE EDUCATION/TRAINING PROGRAM
Payer: COMMERCIAL

## 2023-01-08 PROBLEM — R91.8 PULMONARY INFILTRATES ON CXR: Status: ACTIVE | Noted: 2023-01-08

## 2023-01-08 LAB
BACTERIA SPEC CULT: ABNORMAL
BACTERIA SPEC CULT: ABNORMAL
COLONY COUNT,CNT: ABNORMAL
ECHO AO ROOT DIAM: 2.9 CM
ECHO AO ROOT INDEX: 1.49 CM/M2
ECHO AV PEAK GRADIENT: 10 MMHG
ECHO AV PEAK VELOCITY: 1.6 M/S
ECHO AV VELOCITY RATIO: 0.69
ECHO EST RA PRESSURE: 8 MMHG
ECHO LA DIAMETER INDEX: 2.11 CM/M2
ECHO LA DIAMETER: 4.1 CM
ECHO LA TO AORTIC ROOT RATIO: 1.41
ECHO LV E' LATERAL VELOCITY: 10 CM/S
ECHO LV E' SEPTAL VELOCITY: 8 CM/S
ECHO LV EJECTION FRACTION BIPLANE: 58 % (ref 55–100)
ECHO LV FRACTIONAL SHORTENING: 30 % (ref 28–44)
ECHO LV INTERNAL DIMENSION DIASTOLE INDEX: 2.42 CM/M2
ECHO LV INTERNAL DIMENSION DIASTOLIC: 4.7 CM (ref 3.9–5.3)
ECHO LV INTERNAL DIMENSION SYSTOLIC INDEX: 1.7 CM/M2
ECHO LV INTERNAL DIMENSION SYSTOLIC: 3.3 CM
ECHO LV IVSD: 1.2 CM (ref 0.6–0.9)
ECHO LV MASS 2D: 187.5 G (ref 67–162)
ECHO LV MASS INDEX 2D: 96.7 G/M2 (ref 43–95)
ECHO LV POSTERIOR WALL DIASTOLIC: 1 CM (ref 0.6–0.9)
ECHO LV RELATIVE WALL THICKNESS RATIO: 0.43
ECHO LVOT PEAK GRADIENT: 5 MMHG
ECHO LVOT PEAK VELOCITY: 1.1 M/S
ECHO MV A VELOCITY: 0.96 M/S
ECHO MV E DECELERATION TIME (DT): 205 MS
ECHO MV E VELOCITY: 1.03 M/S
ECHO MV E/A RATIO: 1.07
ECHO MV E/E' LATERAL: 10.3
ECHO MV E/E' RATIO (AVERAGED): 11.59
ECHO MV E/E' SEPTAL: 12.88
ECHO PV MAX VELOCITY: 1.2 M/S
ECHO PV PEAK GRADIENT: 5 MMHG
ECHO PVEIN A DURATION: 77 MS
ECHO PVEIN A VELOCITY: 0.3 M/S
ECHO RIGHT VENTRICULAR SYSTOLIC PRESSURE (RVSP): 60 MMHG
ECHO RV INTERNAL DIMENSION: 3.5 CM
ECHO TV REGURGITANT MAX VELOCITY: 3.59 M/S
ECHO TV REGURGITANT PEAK GRADIENT: 52 MMHG
SPECIAL REQUESTS,SREQ: ABNORMAL

## 2023-01-08 PROCEDURE — 74011250637 HC RX REV CODE- 250/637: Performed by: OBSTETRICS & GYNECOLOGY

## 2023-01-08 PROCEDURE — 93970 EXTREMITY STUDY: CPT

## 2023-01-08 PROCEDURE — 74011250637 HC RX REV CODE- 250/637: Performed by: INTERNAL MEDICINE

## 2023-01-08 PROCEDURE — 74011000636 HC RX REV CODE- 636: Performed by: OBSTETRICS & GYNECOLOGY

## 2023-01-08 PROCEDURE — 65410000002 HC RM PRIVATE OB

## 2023-01-08 PROCEDURE — 71275 CT ANGIOGRAPHY CHEST: CPT

## 2023-01-08 PROCEDURE — 74011000250 HC RX REV CODE- 250: Performed by: OBSTETRICS & GYNECOLOGY

## 2023-01-08 PROCEDURE — 93306 TTE W/DOPPLER COMPLETE: CPT

## 2023-01-08 RX ORDER — LANOLIN ALCOHOL/MO/W.PET/CERES
1.5 CREAM (GRAM) TOPICAL
Status: DISCONTINUED | OUTPATIENT
Start: 2023-01-08 | End: 2023-01-09 | Stop reason: HOSPADM

## 2023-01-08 RX ORDER — TRAMADOL HYDROCHLORIDE 50 MG/1
50 TABLET ORAL
Status: DISCONTINUED | OUTPATIENT
Start: 2023-01-08 | End: 2023-01-08 | Stop reason: CLARIF

## 2023-01-08 RX ORDER — FUROSEMIDE 40 MG/1
20 TABLET ORAL DAILY
Status: DISCONTINUED | OUTPATIENT
Start: 2023-01-08 | End: 2023-01-09 | Stop reason: HOSPADM

## 2023-01-08 RX ORDER — HYDROXYZINE PAMOATE 25 MG/1
25 CAPSULE ORAL
Status: DISCONTINUED | OUTPATIENT
Start: 2023-01-08 | End: 2023-01-08 | Stop reason: CLARIF

## 2023-01-08 RX ORDER — ALBUTEROL SULFATE 90 UG/1
1 AEROSOL, METERED RESPIRATORY (INHALATION)
Status: DISCONTINUED | OUTPATIENT
Start: 2023-01-08 | End: 2023-01-08 | Stop reason: CLARIF

## 2023-01-08 RX ORDER — FUROSEMIDE 10 MG/ML
20 INJECTION INTRAMUSCULAR; INTRAVENOUS DAILY
Status: DISCONTINUED | OUTPATIENT
Start: 2023-01-08 | End: 2023-01-08 | Stop reason: CLARIF

## 2023-01-08 RX ORDER — HYDRALAZINE HYDROCHLORIDE 20 MG/ML
10 INJECTION INTRAMUSCULAR; INTRAVENOUS
Status: DISCONTINUED | OUTPATIENT
Start: 2023-01-08 | End: 2023-01-08 | Stop reason: CLARIF

## 2023-01-08 RX ORDER — BENZONATATE 100 MG/1
100 CAPSULE ORAL
Status: DISCONTINUED | OUTPATIENT
Start: 2023-01-08 | End: 2023-01-08 | Stop reason: CLARIF

## 2023-01-08 RX ORDER — LABETALOL 200 MG/1
200 TABLET, FILM COATED ORAL EVERY 12 HOURS
Status: DISCONTINUED | OUTPATIENT
Start: 2023-01-08 | End: 2023-01-09 | Stop reason: HOSPADM

## 2023-01-08 RX ADMIN — PANTOPRAZOLE SODIUM 40 MG: 40 TABLET, DELAYED RELEASE ORAL at 08:20

## 2023-01-08 RX ADMIN — OXYCODONE AND ACETAMINOPHEN 1 TABLET: 5; 325 TABLET ORAL at 01:56

## 2023-01-08 RX ADMIN — SODIUM CHLORIDE, PRESERVATIVE FREE 10 ML: 5 INJECTION INTRAVENOUS at 21:01

## 2023-01-08 RX ADMIN — AMOXICILLIN AND CLAVULANATE POTASSIUM 1 TABLET: 500; 125 TABLET, FILM COATED ORAL at 05:03

## 2023-01-08 RX ADMIN — METOPROLOL TARTRATE 50 MG: 25 TABLET, FILM COATED ORAL at 08:20

## 2023-01-08 RX ADMIN — OXYCODONE AND ACETAMINOPHEN 1 TABLET: 5; 325 TABLET ORAL at 08:20

## 2023-01-08 RX ADMIN — LABETALOL HYDROCHLORIDE 200 MG: 200 TABLET, FILM COATED ORAL at 11:41

## 2023-01-08 RX ADMIN — OXYCODONE AND ACETAMINOPHEN 1 TABLET: 5; 325 TABLET ORAL at 20:05

## 2023-01-08 RX ADMIN — FUROSEMIDE 20 MG: 40 TABLET ORAL at 13:54

## 2023-01-08 RX ADMIN — IBUPROFEN 800 MG: 800 TABLET, FILM COATED ORAL at 05:08

## 2023-01-08 RX ADMIN — HYDROCHLOROTHIAZIDE: 25 TABLET ORAL at 11:33

## 2023-01-08 RX ADMIN — LABETALOL HYDROCHLORIDE 200 MG: 200 TABLET, FILM COATED ORAL at 20:57

## 2023-01-08 RX ADMIN — AMOXICILLIN AND CLAVULANATE POTASSIUM 1 TABLET: 500; 125 TABLET, FILM COATED ORAL at 17:33

## 2023-01-08 RX ADMIN — OXYCODONE AND ACETAMINOPHEN 1 TABLET: 5; 325 TABLET ORAL at 15:44

## 2023-01-08 RX ADMIN — IOPAMIDOL 100 ML: 755 INJECTION, SOLUTION INTRAVENOUS at 17:27

## 2023-01-08 RX ADMIN — SODIUM CHLORIDE, PRESERVATIVE FREE 10 ML: 5 INJECTION INTRAVENOUS at 15:54

## 2023-01-08 RX ADMIN — SODIUM CHLORIDE, PRESERVATIVE FREE 10 ML: 5 INJECTION INTRAVENOUS at 05:05

## 2023-01-08 NOTE — PROGRESS NOTES
Jim Aragon is a 42 y.o. female with a PMH of hypertension and  delivery on  who presented with LE swelling. In ED hypertensive, was given IV labetalol and magnesium infusion. Initial labs significant for hemoglobin of 10.4, alk phosphatase of 144, LDL of 275, and BNP of 566. UA consistent with UTI. Patient started developing some chest pain and shortness of breath. EKG with sinus rhythm with occasional PVC sees. CXR with right airspace infiltrates. Internal medicine consulted for elevated blood pressure and further medical management. Patient started on Augmentin. Echo pending. Consult to medicine was discontinued prior to exam of patient. Orders pending but ordered by medicine include: duplex BLE.   Will redact orders aside from duplex BLE  Consult if needed

## 2023-01-08 NOTE — PROGRESS NOTES
[id number] Dr Dangelo Barfield on unit . Notified  him bp 163/103 no new orders  303 Dr Mauro Coates on unit notified bp 169/102 and that Dr Sandy Calero changed bp meds. Received order to give lasix 20 mg po daily   1810 called Dr Dangelo Barfield to notifiy results of Ct scan. States he is in hospital and will look them up. No new orders.

## 2023-01-08 NOTE — PROGRESS NOTES
0005  Pt sleeping; easily aroused with light verbal stimuli. Writer obtained VS from pt and noticed infant in S.O. arms laying on couch. Writer informed pt that infant cannot be sleeping with adult on couch or in bed. S.O. spoke up and stated he wasn't sleeping. Writer acknowledged with, \"Okay\". 6004  [employer] Your [employer]ion! message sent to Dr. Felipe Garsia giving him BP's throughout the night; Dr. Madhavi Ordonez has read but not responded to [facility] message; pt to get 2D echo today; and request for stool softener (pt has not had BM and is taking percocet).

## 2023-01-08 NOTE — PROGRESS NOTES
Problem: Hypertension  Goal: *Blood pressure within specified parameters  Outcome: Progressing Towards Goal  Goal: *Fluid volume balance  Outcome: Progressing Towards Goal  Goal: *Labs within defined limits  Outcome: Progressing Towards Goal     Problem: Patient Education: Go to Patient Education Activity  Goal: Patient/Family Education  Outcome: Progressing Towards Goal     Problem: Pain  Goal: *Control of Pain  Outcome: Progressing Towards Goal     Problem: Patient Education: Go to Patient Education Activity  Goal: Patient/Family Education  Outcome: Progressing Towards Goal

## 2023-01-08 NOTE — PROGRESS NOTES
1925  Pt sitting up in bed talking with visitors. Infant in visitor's arms at present. No distress noted. No complaints voiced at this time. Report received from Claudia House RN. Pt made aware writer will return for assessment at approximately 2000. Pt verbalized understanding.

## 2023-01-08 NOTE — CONSULTS
2023, 10:31 AM        [facility]  Phone: [phone]      Cardiology Consultation    2023, 10:31 AM      Rosibel Austin  42 y.o. female  1985      Admit Date:  2023    Primary Cardiologist:  Dr. Patten Nurse  Reason for consult: Preeclampsia/uncontrolled hypertension  Requesting provider: Dr. [facility]    Impression:      Uncontrolled hypertension with history of longstanding hypertension in the postpartum period is partly due to preeclampsia. Peripartum cardiomyopathy ruled out by echocardiogram today. Severe pulmonary hypertension. ?  Question fluid overload from pregnancy versus PE. Patient has ruled out for DVT by venous Doppler scan. Plan:      Continue lisinopril/hydrochlorothiazide along with low-dose Lasix for diuresis with watch on electrolyte. I will change patient's metoprolol to labetalol 200 mg p.o. twice daily. Consider chest CT scan to rule out PE due to patient's unexplained severe pulmonary hypertension. I thank you for allowing me to see this patient. Subjective   This is a pleasant 61-year-old pleasant female with a history of hypertension since age 20, has been on lisinopril/hydrochlorothiazide, GERD and obesity who is A0 with  delivery of a healthy baby a week ago who was admitted with complaints of bilateral leg edema, intermittent headaches and some shortness of breath with the precordial chest discomfort. Patient while in hospital has been noted for uncontrolled hypertension. Despite being on lisinopril/hydrochlorothiazide 20/12.5 mg daily and metoprolol started 50 mg p.o. 3 times daily, patient's blood pressure is in 155/91 to 164/99 range. Patient's echocardiogram in hospital has shown normal LV function with severe pulmonary hypertension. RVSP 77 mmHg with mild to moderate TR. Patient while in hospital has ruled out for DVT by venous duplex scan.   Her chest x-ray has shown right lung pneumonia without any history of fever or leukocytosis. Past medical, Family & Social History   The following medical history was reviewed    Past Medical History:   Past Medical History:   Diagnosis Date    Anxiety     GERD (gastroesophageal reflux disease)     Headache     Hypercholesterolemia     Hypertension     Obesity     Phobia     Psychiatric disorder       Past Surgical History:   Procedure Laterality Date    HX  SECTION      HX TONSIL AND ADENOIDECTOMY      NC CAUTERY CERVIX CRYOCAUTERY INITIAL/REPEAT         Social History:  Social History     Socioeconomic History    Marital status: SINGLE     Spouse name: Not on file    Number of children: Not on file    Years of education: Not on file    Highest education level: Not on file   Occupational History    Not on file   Tobacco Use    Smoking status: Former    Smokeless tobacco: Never   Vaping Use    Vaping Use: Never used   Substance and Sexual Activity    Alcohol use: Not Currently    Drug use: Never    Sexual activity: Yes     Birth control/protection: None     Comment: Stopped birth control 2 months ago   Other Topics Concern    Not on file   Social History Narrative    Not on file     Social Determinants of Health     Financial Resource Strain: Not on file   Food Insecurity: Not on file   Transportation Needs: Not on file   Physical Activity: Not on file   Stress: Not on file   Social Connections: Not on file   Intimate Partner Violence: Not on file   Housing Stability: Not on file       Family History:   Family History   Problem Relation Age of Onset    Diabetes Maternal Grandmother     Hypertension Maternal Grandmother     Diabetes Paternal Grandmother     Hypertension Paternal Grandmother        Medications & Allergies     Allergies:   No Known Allergies    Home Medications:  Prior to Admission medications    Medication Sig Start Date End Date Taking?  Authorizing Provider   ibuprofen (MOTRIN) 800 mg tablet Take 1 Tablet by mouth every eight (8) hours as needed for Pain. 12/30/22  Yes Tati Silva MD   labetaloL (NORMODYNE) 200 mg tablet Take 1 tablet by mouth twice daily 11/16/22  Yes Jaime Hadley MD   omeprazole (PRILOSEC) 40 mg capsule 1 capsule   Yes Provider, Historical   prenatal vitamin (Prenatal Multivitamins) 28 mg iron- 800 mcg tab tablet Take 1 Tablet by mouth daily. 7/14/22  Yes Rajiv English MD   oxyCODONE IR (ROXICODONE) 5 mg immediate release tablet Take 1 Tablet by mouth every four (4) hours as needed for Pain for up to 14 days. Max Daily Amount: 30 mg. 1/1/23 1/15/23  Tati Silva MD   oxyCODONE-acetaminophen (PERCOCET) 5-325 mg per tablet Take 1 Tablet by mouth every four (4) hours as needed for Pain for up to 14 days. Max Daily Amount: 6 Tablets. 12/30/22 1/13/23  Hilaria Canales MD   aspirin delayed-release 81 mg tablet Take 1 Tablet by mouth daily. Patient not taking: No sig reported 6/23/22   Rajiv English MD         REVIEW OF SYSTEMS    Review of Systems   Constitutional:  Negative for chills, fever and weight loss. HENT:  Negative for congestion and sore throat. Eyes:  Negative for blurred vision and double vision. Respiratory:  Negative for cough, shortness of breath and wheezing. Cardiovascular:  Positive for chest pain and leg swelling. Negative for claudication and PND. Gastrointestinal:  Negative for abdominal pain, blood in stool, nausea and vomiting. Genitourinary:  Negative for dysuria and hematuria. Musculoskeletal:  Negative for joint pain and myalgias. Skin:  Negative for itching and rash. Neurological:  Positive for headaches. Negative for dizziness, sensory change, focal weakness and loss of consciousness. Endo/Heme/Allergies:  Negative for polydipsia. Does not bruise/bleed easily. Psychiatric/Behavioral:  Negative for depression and hallucinations.          Objective       Vitals, I/O's, Weight     24 hour vital signs  BP  Min: 120/68  Max: 168/106  Temp  Av.4 °F (36.9 °C)  Min: 98.1 °F (36.7 °C)  Max: 98.7 °F (37.1 °C)  Capillary Refill could not be evaluated. This SmartLink does not work with rows of the type: Custom List  Resp  Av.2  Min: 18  Max: 20  SpO2  Av.2 %  Min: 97 %  Max: 321 %  Systolic (83DHZ), IYY:546 , Min:120 , OJL:747   Diastolic (77HNM), OUR:036, Min:68, Max:107    Body mass index is 37.86 kg/m². Intake/Output Summary (Last 24 hours) at 2023 1031  Last data filed at 2023 0400  Gross per 24 hour   Intake --   Output 2565 ml   Net -2565 ml     Patient Vitals for the past 120 hrs:   Weight   23 2338 103 kg (227 lb)   23 0856 103 kg (227 lb)   23 0920 93.9 kg (207 lb)           Admit weight: Weight: 103 kg (227 lb)      Exam       Constitutional Generally well without symptoms, No weight loss, no fever, chills or nausea or vominting, and Well developed, well nourished in no apparent distress   HEENT normal atraumatic, no neck masses, normal thyroid   Cardiovascular regular heart rate, no murmurs, no JVD, S1 and S2 normal, no gallops notedboth carotids normal upstroke without bruits, radial pulses normal, pedal pulses normal both DP's and Pt's,  LE edema 2+   Pulmonary clear to auscultation bilaterally   Abnominal soft, non-tender, without masses or organomegaly, normal bowel sounds, no masses palpated   Genitourinary Deferred   Muskuloskeletal No obvious joint deformities.   No clubbing   Neuro Alert and oriented   Psychiatric Demonstrates good judgement and insight into his or her medical condition   Extremities warm, well perfused   Skin Warm and dry         Labs     Lab Results   Component Value Date/Time    WBC 9.0 2023 12:00 AM    HGB 10.4 (L) 2023 12:00 AM    HCT 32.0 (L) 2023 12:00 AM    PLATELET 488 9796 12:00 AM    MCV 87.2 2023 12:00 AM     Lab Results   Component Value Date/Time    Sodium 140 2023 12:00 AM    Potassium 3.5 2023 12:00 AM Chloride 105 01/05/2023 12:00 AM    CO2 28 01/05/2023 12:00 AM    Anion gap 7 01/05/2023 12:00 AM    Glucose 104 (H) 01/05/2023 12:00 AM    BUN 9 01/05/2023 12:00 AM    Creatinine 0.70 01/05/2023 12:00 AM    BUN/Creatinine ratio 13 01/05/2023 12:00 AM    GFR est AA >60 03/07/2022 12:05 AM    GFR est non-AA >60 03/07/2022 12:05 AM    Calcium 8.9 01/05/2023 12:00 AM      Last Lipid:  No results found for: CHOL, CHOLX, CHLST, CHOLV, HDL, HDLP, LDL, LDLC, DLDLP, TGLX, TRIGL, TRIGP, CHHD, CHHDX  No results found for: CPK, RCK1, RCK2, RCK3, RCK4, CKMB, CKNDX, CKND1, TROPT, TROIQ, BNPP, BNP   Lab Results   Component Value Date/Time    TSH 0.513 06/23/2022 12:08 PM    T4, Free 1.21 06/23/2022 12:08 PM          Imaging & Acillary Studies     EKG:   Sinus rhythm with occasional Premature ventricular complexes   Otherwise normal ECG     XR CHEST PORT   Final Result   Right airspace infiltrates compatible with pneumonia in the proper   clinical setting. DUPLEX LOWER EXT VENOUS BILAT    (Results Pending)       Echo: Total time spent:  60 minutes      Dr. Arvin Hdz.  Jermaine Suazo MD.PhD. [facility]

## 2023-01-08 NOTE — PROGRESS NOTES
Progress Note    Patient: Venkat Navarrete MRN: [id number]  SSN: [id number]    YOB: 1985  Age: 42 y.o. Sex: female      Admit Date: 1/4/2023    LOS: 2 days     Subjective:     No Complaints    Objective:     Vitals:    01/08/23 0755 01/08/23 0820 01/08/23 0920 01/08/23 1133   BP: 120/68 (!) 163/103  (!) 162/102   Pulse: 80 76  79   Resp: 18   18   Temp: 98.1 °F (36.7 °C)   98.4 °F (36.9 °C)   SpO2: 100%   100%   Weight:   207 lb (93.9 kg)    Height:            Intake and Output:  Current Shift: No intake/output data recorded. Last three shifts: 01/06 1901 - 01/08 0700  In: -   Out: 4326 [Urine:2565]    Physical Exam:   Abd:  FF  INC: CDI    Lab/Data Review: All lab results for the last 24 hours reviewed. Assessment:     Active Problems:    Hypertension (1/5/2023)      Preeclampsia in postpartum period (1/7/2023)      Pulmonary infiltrates on CXR (1/8/2023)      Plan:     Cards to see  Hospitalist consult cancelled since pt will need outpt.  F/up with cards  Cont PO abxs  will await BP med changes by Cards if any    Signed By: Rigoberto Mclaughlin MD     January 8, 2023

## 2023-01-09 VITALS
WEIGHT: 207 LBS | HEIGHT: 62 IN | RESPIRATION RATE: 18 BRPM | BODY MASS INDEX: 38.09 KG/M2 | TEMPERATURE: 97.8 F | SYSTOLIC BLOOD PRESSURE: 155 MMHG | DIASTOLIC BLOOD PRESSURE: 98 MMHG | HEART RATE: 101 BPM | OXYGEN SATURATION: 100 %

## 2023-01-09 PROCEDURE — 74011250637 HC RX REV CODE- 250/637: Performed by: OBSTETRICS & GYNECOLOGY

## 2023-01-09 PROCEDURE — 99222 1ST HOSP IP/OBS MODERATE 55: CPT | Performed by: OBSTETRICS & GYNECOLOGY

## 2023-01-09 PROCEDURE — 74011250637 HC RX REV CODE- 250/637: Performed by: INTERNAL MEDICINE

## 2023-01-09 PROCEDURE — 74011000250 HC RX REV CODE- 250: Performed by: OBSTETRICS & GYNECOLOGY

## 2023-01-09 RX ORDER — LABETALOL 200 MG/1
200 TABLET, FILM COATED ORAL ONCE
Status: COMPLETED | OUTPATIENT
Start: 2023-01-09 | End: 2023-01-09

## 2023-01-09 RX ORDER — AMOXICILLIN AND CLAVULANATE POTASSIUM 500; 125 MG/1; MG/1
1 TABLET, FILM COATED ORAL EVERY 12 HOURS
Qty: 20 TABLET | Refills: 0 | Status: SHIPPED | OUTPATIENT
Start: 2023-01-09 | End: 2023-01-19

## 2023-01-09 RX ORDER — FACIAL-BODY WIPES
10 EACH TOPICAL DAILY PRN
Status: DISCONTINUED | OUTPATIENT
Start: 2023-01-09 | End: 2023-01-09 | Stop reason: HOSPADM

## 2023-01-09 RX ADMIN — HYDROCHLOROTHIAZIDE: 25 TABLET ORAL at 09:46

## 2023-01-09 RX ADMIN — SODIUM CHLORIDE, PRESERVATIVE FREE 10 ML: 5 INJECTION INTRAVENOUS at 05:00

## 2023-01-09 RX ADMIN — BISACODYL 10 MG: 10 SUPPOSITORY RECTAL at 12:42

## 2023-01-09 RX ADMIN — LABETALOL HYDROCHLORIDE 200 MG: 200 TABLET, FILM COATED ORAL at 12:29

## 2023-01-09 RX ADMIN — LABETALOL HYDROCHLORIDE 200 MG: 200 TABLET, FILM COATED ORAL at 09:32

## 2023-01-09 RX ADMIN — FUROSEMIDE 20 MG: 40 TABLET ORAL at 09:32

## 2023-01-09 RX ADMIN — PANTOPRAZOLE SODIUM 40 MG: 40 TABLET, DELAYED RELEASE ORAL at 09:32

## 2023-01-09 RX ADMIN — OXYCODONE AND ACETAMINOPHEN 1 TABLET: 5; 325 TABLET ORAL at 04:58

## 2023-01-09 RX ADMIN — AMOXICILLIN AND CLAVULANATE POTASSIUM 1 TABLET: 500; 125 TABLET, FILM COATED ORAL at 04:58

## 2023-01-09 NOTE — PROGRESS NOTES
Progress Note      1/9/2023 12:00 PM  NAME: Jennifer Reeves   MRN:  [id number]   Admit Diagnosis: Hypertension [I10]          Assessment/Plan:     Uncontrolled hypertension/preeclampsia improved on lisinopril/hydrochlorothiazide and labetalol. To further optimize patient blood pressure control, I will increase patient's labetalol from 200 twice daily to 200 twice daily. Peripartum cardiomyopathy and PE ruled out. Moderate to severe pulmonary hypertension  Disposition. Patient can be discharged to home from the cardiac standpoint. Patient has been advised to arrange follow-up with me in 2 to 4 weeks. I will call patient's prescription of lisinopril,hydrochlorothiazide and labetalol. []       High complexity decision making was performed in this patient at high risk for decompensation with multiple organ involvement. Subjective:     Jennifer Reeves denies chest pain, dyspnea. Discussed with RN events overnight. Review of Systems:    Review of Systems   Constitutional:  Negative for chills, fever and weight loss. HENT:  Negative for congestion and sore throat. Eyes:  Negative for blurred vision and double vision. Respiratory:  Negative for cough, shortness of breath and wheezing. Cardiovascular:  Negative for claudication and PND. Gastrointestinal:  Negative for abdominal pain, blood in stool, nausea and vomiting. Genitourinary:  Negative for dysuria and hematuria. Musculoskeletal:  Negative for joint pain and myalgias. Skin:  Negative for itching and rash. Neurological:  Negative for dizziness, sensory change, focal weakness, loss of consciousness and headaches. Endo/Heme/Allergies:  Negative for polydipsia. Does not bruise/bleed easily. Psychiatric/Behavioral:  Negative for depression and hallucinations. Objective:      Physical Exam:    Last 24hrs VS reviewed since prior progress note.  Most recent are:    Visit Vitals  BP (!) 141/94 (BP 1 Location: Left upper arm, BP Patient Position: At rest;Semi fowlers)   Pulse (!) 101   Temp 97.8 °F (36.6 °C)   Resp 18   Ht 5' 2\" (1.575 m)   Wt 93.9 kg (207 lb)   LMP 04/15/2022 (Approximate)   SpO2 100%   Breastfeeding No   BMI 37.86 kg/m²       Intake/Output Summary (Last 24 hours) at 1/9/2023 1200  Last data filed at 1/8/2023 2103  Gross per 24 hour   Intake 1240 ml   Output 2100 ml   Net -860 ml        Physical Exam:  Constitutional: Well developed well-nourished patient who appeared in no acute distress  HEENT: Normocephalic and atraumatic. Extraocular movement intact. Pupil reactive to light bilaterally  Neck: Supple without thyromegaly  Lungs: Scattered rhonchi diffusely  Heart:  Regular rhythm, normal S1-S2.  Jugular venous distention absent. Carotid bruits absent. PMI in fifth intercostal space on left midclavicular line. Extremities  Trace edema bilaterally  Abdomen: Soft nontender nondistended hypoactive bowel sounds  Skin: Dry and warm    []         Post-cath site without hematoma, bruit, tenderness, or thrill. Distal pulses intact. PMH/SH reviewed - no change compared to H&P    Data Review    Telemetry: normal sinus rhythm     EKG:   []  No new EKG for review    CTA CHEST W OR W WO CONT   Final Result   There is no pulmonary embolism. There is no aortic aneurysm or dissection. Right upper lobe groundglass opacities with minimal right middle lobe   groundglass opacity. Associated small to moderate right-sided effusion and trace   left-sided effusion, imaging findings most likely related to multifocal   infectious/inflammatory process, asymmetric pulmonary edema is not completely   excluded. . Incidental findings are as described above. DUPLEX LOWER EXT VENOUS BILAT   Final Result      XR CHEST PORT   Final Result   Right airspace infiltrates compatible with pneumonia in the proper   clinical setting. No results found for this or any previous visit (from the past 24 hour(s)).        Echo: 01/04/23    ECHO ADULT COMPLETE 01/08/2023 1/8/2023    Interpretation Summary    Left Ventricle: Normal left ventricular systolic function with a visually estimated EF of 55 - 60%. Left ventricle size is normal. Normal wall thickness. Normal wall motion. Normal diastolic function. Mitral Valve: Mildly thickened leaflet. Tricuspid Valve: The estimated RVSP is 60 mmHg. Mild TR    Left Atrium: Left atrium is mildly dilated. Pericardium: Right pleural effusion. Signed by: Andrei Jenkins MD on 1/8/2023 12:07 PM      Patient's  EKG, laboratory data and echocardiogram were individually reviewed by me. Lab Data:    No results for input(s): WBC, HGB, HCT, PLT, HGBEXT, HCTEXT, PLTEXT in the last 72 hours. No results for input(s): INR, PTP, APTT, INREXT in the last 72 hours. No results for input(s): NA, K, CL, CO2, BUN, CREA, GLU, CA, MG in the last 72 hours. No results for input(s): CPK, CKNDX, TROIQ in the last 72 hours. No lab exists for component: CPKMB  No results found for: CHOL, CHOLX, CHLST, CHOLV, HDL, HDLP, LDL, LDLC, DLDLP, TGLX, TRIGL, TRIGP, CHHD, CHHDX    No results for input(s): AP, TBIL, TP, ALB, GLOB, GGT, AML, LPSE in the last 72 hours. No lab exists for component: SGOT, GPT, AMYP, HLPSE  No results for input(s): PH, PCO2, PO2 in the last 72 hours.     Medications Personally Reviewed:    Current Facility-Administered Medications   Medication Dose Route Frequency    labetaloL (NORMODYNE) tablet 200 mg  200 mg Oral ONCE    melatonin tablet 1.5 mg  1.5 mg Oral QHS PRN    labetaloL (NORMODYNE) tablet 200 mg  200 mg Oral Q12H    furosemide (LASIX) tablet 20 mg  20 mg Oral DAILY    pantoprazole (PROTONIX) tablet 40 mg  40 mg Oral DAILY    amoxicillin-clavulanate (AUGMENTIN) 500-125 mg per tablet 1 Tablet  1 Tablet Oral Q12H    lisinopril/hydroCHLOROthiazide (PRINZIDE/ZESTORETIC) 20/12.5 mg   Oral DAILY    sodium chloride (NS) flush 5-40 mL  5-40 mL IntraVENous Q8H    sodium chloride (NS) flush 5-40 mL  5-40 mL IntraVENous PRN    ondansetron (ZOFRAN ODT) tablet 4 mg  4 mg Oral Q8H PRN    Or    ondansetron (ZOFRAN) injection 4 mg  4 mg IntraVENous Q6H PRN    acetaminophen (TYLENOL) tablet 650 mg  650 mg Oral Q6H PRN    Or    acetaminophen (TYLENOL) suppository 650 mg  650 mg Rectal Q6H PRN    calcium gluconate 1 gram in sodium chloride (ISO-OSM) 50 mL infusion  1 g IntraVENous PRN    oxyCODONE-acetaminophen (PERCOCET) 5-325 mg per tablet 1 Tablet  1 Tablet Oral Q4H PRN    ibuprofen (MOTRIN) tablet 800 mg  800 mg Oral Q6H PRN         Prior to Admission medications    Medication Sig Start Date End Date Taking? Authorizing Provider   ibuprofen (MOTRIN) 800 mg tablet Take 1 Tablet by mouth every eight (8) hours as needed for Pain. 12/30/22  Yes Liliya Car MD   labetaloL (NORMODYNE) 200 mg tablet Take 1 tablet by mouth twice daily 11/16/22  Yes Urban Valentine MD   omeprazole (PRILOSEC) 40 mg capsule 1 capsule   Yes Provider, Historical   prenatal vitamin (Prenatal Multivitamins) 28 mg iron- 800 mcg tab tablet Take 1 Tablet by mouth daily. 7/14/22  Yes Slim Sánchez MD   oxyCODONE IR (ROXICODONE) 5 mg immediate release tablet Take 1 Tablet by mouth every four (4) hours as needed for Pain for up to 14 days. Max Daily Amount: 30 mg. 1/1/23 1/15/23  Liliya Car MD   oxyCODONE-acetaminophen (PERCOCET) 5-325 mg per tablet Take 1 Tablet by mouth every four (4) hours as needed for Pain for up to 14 days. Max Daily Amount: 6 Tablets. 12/30/22 1/13/23  Itz Canales MD   aspirin delayed-release 81 mg tablet Take 1 Tablet by mouth daily.   Patient not taking: No sig reported 6/23/22   MD Veronica Pichardo MD

## 2023-01-09 NOTE — PROGRESS NOTES
Gynecology Progress Note    Alli Crumbly    She is without significant complaints. Pain controlled on current medication. Voiding without difficulty. Patient is passing flatus. She is is tolerating her diet. Vitals:  Temp (24hrs), Av.4 °F (36.9 °C), Min:98 °F (36.7 °C), Max:99 °F (37.2 °C)      Visit Vitals  BP (!) 149/93 (BP 1 Location: Left upper arm, BP Patient Position: Lying right side)   Pulse 89   Temp 99 °F (37.2 °C)   Resp 20   Ht 5' 2\" (1.575 m)   Wt 207 lb (93.9 kg)   SpO2 99%   Breastfeeding No   BMI 37.86 kg/m²        Intake and Output:   Current shift: No intake/output data recorded.   Last 3 completed shifts: 1901 -  0700  In: 1480 [P.O.:1480]  Out: 4665 [Urine:4665]      Exam:  General: alert, cooperative, no distress, appears stated age     Lung: clear to auscultation bilaterally     Heart: regular rate and rhythm, S1, S2 normal, no murmur, click, rub or gallop     Abdomen: Non tender     Incision:  clean, dry, and intact     Extremities: extremities normal, atraumatic, no cyanosis or edema      Labs:   Lab Results   Component Value Date/Time    WBC 9.0 2023 12:00 AM    WBC 14.2 (H) 2022 05:34 AM    WBC 10.6 2022 04:15 PM    WBC 9.1 2022 01:32 PM    WBC 8.7 2022 12:08 PM    WBC 12.5 (H) 2022 12:05 AM    HGB 10.4 (L) 2023 12:00 AM    HGB 10.1 (L) 2022 05:34 AM    HGB 11.5 2022 04:15 PM    HGB 10.4 (L) 2022 01:32 PM    HGB 12.5 2022 12:08 PM    HGB 13.5 2022 12:05 AM    HCT 32.0 (L) 2023 12:00 AM    HCT 31.6 (L) 2022 05:34 AM    HCT 35.0 2022 04:15 PM    HCT 31.3 (L) 2022 01:32 PM    HCT 38.7 2022 12:08 PM    HCT 40.4 2022 12:05 AM    PLATELET 351  12:00 AM    PLATELET 257  05:34 AM    PLATELET 293  04:15 PM    PLATELET 444  01:32 PM    PLATELET 334  12:08 PM    PLATELET 677  12:05 AM       Recent Results (from the past 24 hour(s))   ECHO ADULT COMPLETE    Collection Time: 01/08/23 10:53 AM   Result Value Ref Range    AV Peak Velocity 1.6 m/s    AV Peak Gradient 10 mmHg    Aortic Root 2.9 cm    IVSd 1.2 (A) 0.6 - 0.9 cm    LVIDd 4.7 3.9 - 5.3 cm    LVIDs 3.3 cm    LVOT Peak Velocity 1.1 m/s    LVOT Peak Gradient 5 mmHg    LVPWd 1.0 (A) 0.6 - 0.9 cm    LV E' Lateral Velocity 10 cm/s    LV E' Septal Velocity 8 cm/s    LA Diameter 4.1 cm    MV E Wave Deceleration Time 205.0 ms    MV A Velocity 0.96 m/s    MV E Velocity 1.03 m/s    PV Max Velocity 1.2 m/s    PV Peak Gradient 5 mmHg    Pulm Vein A Duration 77.0 ms    Pulm Vein A Velocity 0.3 m/s    Est. RA Pressure 8 mmHg    RVIDd 3.5 cm    TR Max Velocity 3.59 m/s    TR Peak Gradient 52 mmHg    Fractional Shortening 2D 30 28 - 44 %    LVIDd Index 2.42 cm/m2    LVIDs Index 1.70 cm/m2    LV RWT Ratio 0.43     LV Mass 2D 187.5 (A) 67 - 162 g    LV Mass 2D Index 96.7 (A) 43 - 95 g/m2    MV E/A 1.07     E/E' Ratio (Averaged) 11.59     E/E' Lateral 10.30     E/E' Septal 12.88     LA Size Index 2.11 cm/m2    LA/AO Root Ratio 1.41     Ao Root Index 1.49 cm/m2    AV Velocity Ratio 0.69     RVSP 60 mmHg    EF BP 58 55 - 100 %     CT chest  IMPRESSION  There is no pulmonary embolism. There is no aortic aneurysm or dissection. Right upper lobe groundglass opacities with minimal right middle lobe  groundglass opacity. Associated small to moderate right-sided effusion and trace  left-sided effusion, imaging findings most likely related to multifocal  infectious/inflammatory process, asymmetric pulmonary edema is not completely  excluded.       Assesment: HD#6   Admitted for elevated BP c/w PP severe preeclampsia  Pt on labetalol, BP still suboptimally controlled  Initially SOB with increased LE swelling R/o DVT by LE Dopplers  EKG and ECO c/w severe pulmonary HTN  CXR with pneumonia, on Augmentin  Chest CT normal  r/o PE    Plan:   Cont labetalol  Cont Augmentin  Plan per cardiology recommendations

## 2023-01-09 NOTE — PROGRESS NOTES
Problem: Hypertension  Goal: *Blood pressure within specified parameters  Outcome: Resolved/Met  Goal: *Fluid volume balance  Outcome: Resolved/Met  Goal: *Labs within defined limits  Outcome: Resolved/Met     Problem: Patient Education: Go to Patient Education Activity  Goal: Patient/Family Education  Outcome: Resolved/Met     Problem: Pain  Goal: *Control of Pain  Outcome: Resolved/Met     Problem: Patient Education: Go to Patient Education Activity  Goal: Patient/Family Education  Outcome: Resolved/Met    63 Dr Jaylen James aware of discharging BP and aware she will follow up with Dr Oscar Arias in 2-4 weeks. Discharge instructions reviewed with patient. Patient aware Rx's were sent to pharmacy and aware when can take next dose. Aware that Dr Oscar Arias sent her BP  meds to her pharmacy for Labetalol and Lisinopril/HCTZ. Dr Jaylen James sent rx fo Augmentin. Aware of warning signs and when to notify MD.     Discharge plan of care/case management plan validated with provider discharge order. 1410 Patient discharged to main entrance via wheelchair in stable condition.

## 2023-01-12 NOTE — DISCHARGE SUMMARY
Name: Samm Ely MRN: [id number]  SSN: [id number]    YOB: 1985  Age: 42 y.o. Sex: female      Admit Date: 2023    Discharge Date: 2023     Admitting Physician: Daniel Stout MD     Attending Physician:  Lisa att. providers found     Admission Diagnoses: Hypertension [I10]    Discharge Diagnoses:   Information for the patient's :  Ehsan Sandoval [[id number]]   Delivery of a 9 lb 10.2 oz (4.37 kg) female infant via , Low Transverse on 2022 at 12:39 PM  by Daniel Stout. Apgars were 8  and 9 . Additional Diagnoses:   Hospital Problems  Date Reviewed: 2022            Codes Class Noted POA    Pulmonary infiltrates on CXR ICD-10-CM: R91.8  ICD-9-CM: 793.19  2023 Unknown        Preeclampsia in postpartum period ICD-10-CM: O14.95  ICD-9-CM: 642.44  2023 Unknown        Hypertension ICD-10-CM: I10  ICD-9-CM: 401.9  2023 Unknown          Lab Results   Component Value Date/Time    ABO/Rh(D) Sharol Mcburney Positive 2022 04:15 PM       Immunization(s): There is no immunization history for the selected administration types on file for this patient. Hospital Course: Postpartum course was complicated by hypertension, Patient Instructions:   Cannot display discharge medications since this patient is not currently admitted. Reference my discharge instructions. Follow-up Appointments   Procedures    FOLLOW UP VISIT Appointment in: One Week     Standing Status:   Standing     Number of Occurrences:   1     Order Specific Question:   Appointment in     Answer: One Week        Signed By:  Daniel Stout MD     2023      \"[id number]\"     I spent 30 minutes or less with the patient to perform a final examination, discuss the hospital stay, give instructions for continuing care to all relevant caregivers and prepare discharge records, prescriptions and referral forms.

## 2023-01-16 ENCOUNTER — OFFICE VISIT (OUTPATIENT)
Dept: OBGYN CLINIC | Age: 38
End: 2023-01-16
Payer: COMMERCIAL

## 2023-01-16 VITALS
BODY MASS INDEX: 35.56 KG/M2 | WEIGHT: 193.25 LBS | DIASTOLIC BLOOD PRESSURE: 95 MMHG | SYSTOLIC BLOOD PRESSURE: 150 MMHG | HEIGHT: 62 IN

## 2023-01-16 DIAGNOSIS — Z09 POSTOP CHECK: Primary | ICD-10-CM

## 2023-01-16 PROCEDURE — 99024 POSTOP FOLLOW-UP VISIT: CPT | Performed by: OBSTETRICS & GYNECOLOGY

## 2023-01-16 RX ORDER — LABETALOL 300 MG/1
300 TABLET, FILM COATED ORAL EVERY 8 HOURS
Qty: 90 TABLET | Refills: 5 | Status: SHIPPED | OUTPATIENT
Start: 2023-01-16

## 2023-01-16 RX ORDER — LABETALOL 300 MG/1
300 TABLET, FILM COATED ORAL 2 TIMES DAILY
COMMUNITY
End: 2023-01-16 | Stop reason: SDUPTHER

## 2023-01-16 RX ORDER — LISINOPRIL 20 MG/1
20 TABLET ORAL DAILY
COMMUNITY
Start: 2023-01-09

## 2023-01-16 RX ORDER — HYDROCHLOROTHIAZIDE 25 MG/1
25 TABLET ORAL DAILY
COMMUNITY
Start: 2023-01-09

## 2023-01-16 NOTE — H&P
History & Physical    Name: Keegan Chirinos MRN: [id number]  SSN: [id number]    YOB: 1985  Age: 42 y.o. Sex: female        Subjective:     Estimated Date of Delivery: 23  OB History    Para Term  AB Living   1 1 1     1   SAB IAB Ectopic Molar Multiple Live Births           0 1      # Outcome Date GA Lbr Silvestre/2nd Weight Sex Delivery Anes PTL Lv   1 Term 22 39w1d  9 lb 10.2 oz (4.37 kg) F CS-LTranv Spinal N ESCOBAR      Complications: Failure to Progress in First Stage       Ms. Munguia is admitted with elevated BP and bilat LE swelling. Pt underwent uncomplicated C/S 4 days ago. Past Medical History:   Diagnosis Date    Anxiety     GERD (gastroesophageal reflux disease)     Headache     Hypercholesterolemia     Hypertension     Obesity     Phobia     Psychiatric disorder      Past Surgical History:   Procedure Laterality Date    HX  SECTION      HX TONSIL AND ADENOIDECTOMY      [place] CAUTERY CERVIX CRYOCAUTERY INITIAL/REPEAT       Social History     Occupational History    Not on file   Tobacco Use    Smoking status: Former    Smokeless tobacco: Never   Vaping Use    Vaping Use: Never used   Substance and Sexual Activity    Alcohol use: Not Currently    Drug use: Never    Sexual activity: Yes     Birth control/protection: None     Comment: Stopped birth control 2 months ago     Family History   Problem Relation Age of Onset    Diabetes Maternal Grandmother     Hypertension Maternal Grandmother     Diabetes Paternal Grandmother     Hypertension Paternal Grandmother        No Known Allergies  Prior to Admission medications    Medication Sig Start Date End Date Taking? Authorizing Provider   amoxicillin-clavulanate (AUGMENTIN) 500-125 mg per tablet Take 1 Tablet by mouth every twelve (12) hours for 10 days. 23 Yes Orlin Ho MD   ibuprofen (MOTRIN) 800 mg tablet Take 1 Tablet by mouth every eight (8) hours as needed for Pain.  22  Yes Orlin Ho MD labetaloL (NORMODYNE) 200 mg tablet Take 1 tablet by mouth twice daily 22  Yes Norma Miguel MD   omeprazole (PRILOSEC) 40 mg capsule 1 capsule   Yes Provider, Historical   prenatal vitamin (Prenatal Multivitamins) 28 mg iron- 800 mcg tab tablet Take 1 Tablet by mouth daily. 22  Yes Randee Bettencourt MD   oxyCODONE IR (ROXICODONE) 5 mg immediate release tablet Take 1 Tablet by mouth every four (4) hours as needed for Pain for up to 14 days. Max Daily Amount: 30 mg. 1/1/23 1/15/23  Fariba Canales MD   aspirin delayed-release 81 mg tablet Take 1 Tablet by mouth daily. Patient not taking: No sig reported 22   Randee Bettencourt MD        Review of Systems: A comprehensive review of systems was negative except for that written in the HPI. Objective:     Vitals:  Vitals:    23 0050 23 0450 23 0930 23 1229   BP: (!) 164/105 (!) 149/93 (!) 141/94 (!) 155/98   Pulse: 82 89 (!) 101    Resp: 18 20 18    Temp: 98.4 °F (36.9 °C) 99 °F (37.2 °C) 97.8 °F (36.6 °C)    SpO2: 99% 99% 100%    Weight:       Height:            Physical Exam:  Patient without distress. Heart: Regular rate and rhythm, S1S2 present, or without murmur or extra heart sounds  Lung: clear to auscultation throughout lung fields, no wheezes, no rales, no rhonchi, and normal respiratory effort  Back: costovertebral angle tenderness absent  Abdomen: soft, nontender  Fundus: soft and non tender  Perineum: blood absent,   Lower Extremities:  - Edema 2+  Incision c/d/I    Prenatal Labs:   Lab Results   Component Value Date/Time    ABO/Rh(D) O Positive 2022 04:15 PM         Assessment/Plan:     Active Problems:    Hypertension (2023)      Preeclampsia in postpartum period (2023)      Pulmonary infiltrates on CXR (2023)       Pt presents with bilateral leg and feet swelling. Pt is post-partum, delivering by  on 2022.      Plan: Admit for [address] work up and Pneumonia treatment  Mg

## 2023-01-16 NOTE — PROGRESS NOTES
Subjective:       Maine Trinh is a 42 y.o. female who presents for postop care 14 days following C/S. Appetite is good. Eating a regular diet without difficulty. Bowel movements are regular. The patient is voiding without difficulty. The patient is not having any pain. .    Objective:   Visit Vitals  BP (!) 150/95   Ht 5' 2\" (1.575 m)   Wt 193 lb 4 oz (87.7 kg)   LMP 04/15/2022 (Approximate)   Breastfeeding Yes   BMI 35.35 kg/m²          General:  alert, cooperative, no distress, appears stated age   Abdomen: soft, bowel sounds active, non-tender   Incision:   C/D/I     Assessment:   Doing well postoperatively. /90  HAS AN FARHAD WITH CARDIO THIS WEEK  WILL INCREASE LABETALOL FROM 300 BID  TID      Plan:     Orders Placed This Encounter    lisinopriL (PRINIVIL, ZESTRIL) 20 mg tablet     Sig: Take 20 mg by mouth daily. hydroCHLOROthiazide (HYDRODIURIL) 25 mg tablet     Sig: Take 25 mg by mouth daily. DISCONTD: labetaloL (NORMODYNE) 300 mg tablet     Sig: Take 300 mg by mouth two (2) times a day. labetaloL (NORMODYNE) 300 mg tablet     Sig: Take 1 Tablet by mouth every eight (8) hours. Dispense:  90 Tablet     Refill:  5       Follow-up and Dispositions    Return in about 4 weeks (around 2/13/2023).

## 2023-01-18 ENCOUNTER — TRANSCRIBE ORDER (OUTPATIENT)
Dept: SCHEDULING | Age: 38
End: 2023-01-18

## 2023-01-18 DIAGNOSIS — I15.9 SECONDARY HYPERTENSION: Primary | ICD-10-CM

## 2023-01-25 ENCOUNTER — HOSPITAL ENCOUNTER (OUTPATIENT)
Dept: NON INVASIVE DIAGNOSTICS | Age: 38
Discharge: HOME | End: 2023-01-25
Attending: INTERNAL MEDICINE

## 2023-01-25 DIAGNOSIS — I15.9 SECONDARY HYPERTENSION: ICD-10-CM

## 2023-02-02 ENCOUNTER — HOSPITAL ENCOUNTER (OUTPATIENT)
Dept: NON INVASIVE DIAGNOSTICS | Age: 38
Discharge: HOME | End: 2023-02-02
Attending: INTERNAL MEDICINE
Payer: COMMERCIAL

## 2023-02-02 LAB
ABDOMINAL MID AORTA VEL: 116.3 CM/S
LEFT ARM BP: 173 MMHG
LEFT KIDNEY LENGTH: 11.09 CM
LEFT RENAL DIST DIAS: 44.9 CM/S
LEFT RENAL DIST RI: 0.67
LEFT RENAL DIST SYS: 138 CM/S
LEFT RENAL LOWER PARENCHYMA MAX: 39 CM/S
LEFT RENAL LOWER PARENCHYMA MIN: 12.3 CM/S
LEFT RENAL LOWER PARENCHYMA RI: 0.68
LEFT RENAL MID DIAS: 22.8 CM/S
LEFT RENAL MID RI: 0.8
LEFT RENAL MID SYS: 114 CM/S
LEFT RENAL MIDDLE PARENCHYMA MAX: 31.5 CM/S
LEFT RENAL MIDDLE PARENCHYMA MIN: 9.6 CM/S
LEFT RENAL MIDDLE PARENCHYMA RI: 0.7
LEFT RENAL ORIGIN DIAS: 25.4 CM/S
LEFT RENAL ORIGIN RI: 0.78
LEFT RENAL ORIGIN SYS: 115 CM/S
LEFT RENAL PROX DIAS: 25 CM/S
LEFT RENAL PROX RI: 0.75
LEFT RENAL PROX SYS: 102 CM/S
LEFT RENAL UPPER PARENCHYMA MAX: 29.4 CM/S
LEFT RENAL UPPER PARENCHYMA MIN: 5.3 CM/S
LEFT RENAL UPPER PARENCHYMA RI: 0.82
MID AORTIC AP: 1.5 CM
MID AORTIC TRANS: 1.81 CM
RIGHT ARM BP: 170 MMHG
RIGHT KIDNEY LENGTH: 10.88 CM
RIGHT RENAL DIST DIAS: 31 CM/S
RIGHT RENAL DIST RI: 0.65
RIGHT RENAL DIST SYS: 89 CM/S
RIGHT RENAL LOWER PARENCHYMA MAX: 30.9 CM/S
RIGHT RENAL LOWER PARENCHYMA MIN: 11.9 CM/S
RIGHT RENAL LOWER PARENCHYMA RI: 0.61
RIGHT RENAL MID DIAS: 5.9 CM/S
RIGHT RENAL MID RI: 0.95
RIGHT RENAL MID SYS: 128 CM/S
RIGHT RENAL MIDDLE PARENCHYMA MAX: 20.8 CM/S
RIGHT RENAL MIDDLE PARENCHYMA MIN: 8.9 CM/S
RIGHT RENAL MIDDLE PARENCHYMA RI: 0.57
RIGHT RENAL ORIGIN DIAS: 28.7 CM/S
RIGHT RENAL ORIGIN RI: 0.71
RIGHT RENAL ORIGIN SYS: 100 CM/S
RIGHT RENAL PROX DIAS: 35.1 CM/S
RIGHT RENAL PROX RI: 0.66
RIGHT RENAL PROX SYS: 104 CM/S
RIGHT RENAL UPPER PARENCHYMA MAX: 37.1 CM/S
RIGHT RENAL UPPER PARENCHYMA MIN: 7.4 CM/S
RIGHT RENAL UPPER PARENCHYMA RI: 0.8

## 2023-02-02 PROCEDURE — 93975 VASCULAR STUDY: CPT

## 2023-02-15 ENCOUNTER — OFFICE VISIT (OUTPATIENT)
Dept: OBGYN CLINIC | Age: 38
End: 2023-02-15
Payer: COMMERCIAL

## 2023-02-15 VITALS
SYSTOLIC BLOOD PRESSURE: 131 MMHG | BODY MASS INDEX: 36.09 KG/M2 | HEIGHT: 62 IN | WEIGHT: 196.13 LBS | DIASTOLIC BLOOD PRESSURE: 79 MMHG

## 2023-02-15 DIAGNOSIS — N94.6 DYSMENORRHEA: ICD-10-CM

## 2023-02-15 PROCEDURE — 0503F POSTPARTUM CARE VISIT: CPT | Performed by: OBSTETRICS & GYNECOLOGY

## 2023-02-15 RX ORDER — ACETAMINOPHEN AND CODEINE PHOSPHATE 120; 12 MG/5ML; MG/5ML
1 SOLUTION ORAL DAILY
Qty: 1 DOSE PACK | Refills: 5 | Status: SHIPPED | OUTPATIENT
Start: 2023-02-15

## 2023-02-15 NOTE — PROGRESS NOTES
Garrett Cortez is a 42 y.o. female who presents today for the following:  Chief Complaint   Patient presents with    Post-Partum Care     Pt presents for post partum appt with complaints of tenderness and a \"knot\" above belly button//Inderjitley         No Known Allergies    Current Outpatient Medications   Medication Sig    norethindrone (Ortho Micronor) 0.35 mg tab Take 1 Tablet by mouth daily. lisinopriL (PRINIVIL, ZESTRIL) 20 mg tablet Take 20 mg by mouth daily. hydroCHLOROthiazide (HYDRODIURIL) 25 mg tablet Take 25 mg by mouth daily. labetaloL (NORMODYNE) 300 mg tablet Take 1 Tablet by mouth every eight (8) hours. ibuprofen (MOTRIN) 800 mg tablet Take 1 Tablet by mouth every eight (8) hours as needed for Pain. omeprazole (PRILOSEC) 40 mg capsule 1 capsule    prenatal vitamin (Prenatal Multivitamins) 28 mg iron- 800 mcg tab tablet Take 1 Tablet by mouth daily. aspirin delayed-release 81 mg tablet Take 1 Tablet by mouth daily. (Patient not taking: No sig reported)     No current facility-administered medications for this visit.        Past Medical History:   Diagnosis Date    Anxiety     GERD (gastroesophageal reflux disease)     Headache     Hypercholesterolemia     Hypertension     Obesity     Phobia     Psychiatric disorder        Past Surgical History:   Procedure Laterality Date    HX  SECTION      HX TONSIL AND ADENOIDECTOMY      [place] CAUTERY CERVIX CRYOCAUTERY INITIAL/REPEAT         Family History   Problem Relation Age of Onset    Diabetes Maternal Grandmother     Hypertension Maternal Grandmother     Diabetes Paternal Grandmother     Hypertension Paternal Grandmother        Social History     Socioeconomic History    Marital status: SINGLE     Spouse name: Not on file    Number of children: Not on file    Years of education: Not on file    Highest education level: Not on file   Occupational History    Not on file   Tobacco Use    Smoking status: Former    Smokeless tobacco: Never Vaping Use    Vaping Use: Never used   Substance and Sexual Activity    Alcohol use: Not Currently    Drug use: Never    Sexual activity: Yes     Birth control/protection: None   Other Topics Concern    Not on file   Social History Narrative    Not on file     Social Determinants of Health     Financial Resource Strain: Not on file   Food Insecurity: Not on file   Transportation Needs: Not on file   Physical Activity: Not on file   Stress: Not on file   Social Connections: Not on file   Intimate Partner Violence: Not on file   Housing Stability: Not on file         ROS   Review of Systems   Constitutional: Negative. HENT: Negative. Eyes: Negative. Respiratory: Negative. Cardiovascular: Negative. Gastrointestinal: Negative. Genitourinary: Negative. Musculoskeletal: Negative. Skin: Negative. Neurological: Negative. Endo/Heme/Allergies: Negative. Psychiatric/Behavioral: Negative.       /79   Ht 5' 2\" (1.575 m)   Wt 196 lb 2 oz (89 kg)   Breastfeeding No   BMI 35.87 kg/m²    OBGyn Exam   Constitutional  Appearance: well-nourished, well developed, alert, in no acute distress    HENT  Head and Face: appears normal    Chest  Respiratory Effort: breathing labored    Gastrointestinal  Abdominal Examination: abdomen non-tender to palpation, normal bowel sounds, no masses present    Genitourinary  External Genitalia: normal appearance for age, no discharge present, no tenderness present, no inflammatory lesions present, no masses present, no atrophy present  Vagina: normal vaginal vault without central or paravaginal defects, no discharge present, no inflammatory lesions present, no masses present  Bladder: non-tender to palpation  Urethra: appears normal  Cervix: normal   Uterus: normal size, shape and consistency  Adnexa: no adnexal tenderness present, no adnexal masses present  Perineum: perineum within normal limits, no evidence of trauma, no rashes or skin lesions present  Anus: anus within normal limits, no hemorrhoids present  Inguinal Lymph Nodes: no lymphadenopathy present    Skin  General Inspection: no rash, no lesions identified    Neurologic/Psychiatric  Mental Status:  Orientation: grossly oriented to person, place and time  Mood and Affect: mood normal, affect appropriate    No results found for this visit on 02/15/23. Orders Placed This Encounter    norethindrone (Ortho Micronor) 0.35 mg tab     Sig: Take 1 Tablet by mouth daily. Dispense:  1 Dose Pack     Refill:  5     43 yo PPV  DOING WELL  BP CONTROLLED ON BP MEDS  INCISION C/D/I    1. Routine postpartum follow-up      2. Dysmenorrhea    - norethindrone (Ortho Micronor) 0.35 mg tab; Take 1 Tablet by mouth daily. Dispense: 1 Dose Pack; Refill: 5        Follow-up and Dispositions    Return in about 4 weeks (around 3/15/2023).

## 2023-03-15 ENCOUNTER — OFFICE VISIT (OUTPATIENT)
Dept: OBGYN CLINIC | Age: 38
End: 2023-03-15
Payer: COMMERCIAL

## 2023-03-15 VITALS
HEIGHT: 62 IN | BODY MASS INDEX: 37.43 KG/M2 | DIASTOLIC BLOOD PRESSURE: 78 MMHG | WEIGHT: 203.38 LBS | SYSTOLIC BLOOD PRESSURE: 127 MMHG

## 2023-03-15 DIAGNOSIS — Z11.3 SCREENING FOR VENEREAL DISEASE: ICD-10-CM

## 2023-03-15 DIAGNOSIS — N93.8 DUB (DYSFUNCTIONAL UTERINE BLEEDING): ICD-10-CM

## 2023-03-15 DIAGNOSIS — Z01.419 PAP SMEAR, AS PART OF ROUTINE GYNECOLOGICAL EXAMINATION: Primary | ICD-10-CM

## 2023-03-15 DIAGNOSIS — Z11.51 SCREENING FOR HUMAN PAPILLOMAVIRUS: ICD-10-CM

## 2023-03-15 PROCEDURE — 3078F DIAST BP <80 MM HG: CPT | Performed by: OBSTETRICS & GYNECOLOGY

## 2023-03-15 PROCEDURE — 3074F SYST BP LT 130 MM HG: CPT | Performed by: OBSTETRICS & GYNECOLOGY

## 2023-03-15 PROCEDURE — 99395 PREV VISIT EST AGE 18-39: CPT | Performed by: OBSTETRICS & GYNECOLOGY

## 2023-03-15 RX ORDER — NORETHINDRONE ACETATE AND ETHINYL ESTRADIOL, ETHINYL ESTRADIOL AND FERROUS FUMARATE 1MG-10(24)
1 KIT ORAL DAILY
Qty: 1 DOSE PACK | Refills: 11 | Status: SHIPPED | OUTPATIENT
Start: 2023-03-15 | End: 2023-04-14

## 2023-03-15 NOTE — PROGRESS NOTES
Deborah Miguel is a 42 y.o. female who presents today for the following:  Chief Complaint   Patient presents with    Annual Exam     Pt presents for annual exam with no complaints//MKeeley         No Known Allergies    Current Outpatient Medications   Medication Sig    norethindrone-e.estradioL-iron (Lo Loestrin Fe) 1 mg-10 mcg (24)/10 mcg (2) tab Take 1 Tablet by mouth daily for 30 days. norethindrone (Ortho Micronor) 0.35 mg tab Take 1 Tablet by mouth daily. lisinopriL (PRINIVIL, ZESTRIL) 20 mg tablet Take 20 mg by mouth daily. hydroCHLOROthiazide (HYDRODIURIL) 25 mg tablet Take 25 mg by mouth daily. labetaloL (NORMODYNE) 300 mg tablet Take 1 Tablet by mouth every eight (8) hours. ibuprofen (MOTRIN) 800 mg tablet Take 1 Tablet by mouth every eight (8) hours as needed for Pain. omeprazole (PRILOSEC) 40 mg capsule 1 capsule    aspirin delayed-release 81 mg tablet Take 1 Tablet by mouth daily. (Patient not taking: No sig reported)     No current facility-administered medications for this visit.        Past Medical History:   Diagnosis Date    Anxiety     GERD (gastroesophageal reflux disease)     Headache     Hypercholesterolemia     Hypertension     Obesity     Phobia     Psychiatric disorder        Past Surgical History:   Procedure Laterality Date    HX  SECTION      HX TONSIL AND ADENOIDECTOMY      [place] CAUTERY CERVIX CRYOCAUTERY INITIAL/REPEAT         Family History   Problem Relation Age of Onset    Diabetes Maternal Grandmother     Hypertension Maternal Grandmother     Diabetes Paternal Grandmother     Hypertension Paternal Grandmother        Social History     Socioeconomic History    Marital status: SINGLE     Spouse name: Not on file    Number of children: Not on file    Years of education: Not on file    Highest education level: Not on file   Occupational History    Not on file   Tobacco Use    Smoking status: Former    Smokeless tobacco: Never   Vaping Use    Vaping Use: Never used Substance and Sexual Activity    Alcohol use: Not Currently    Drug use: Never    Sexual activity: Yes     Birth control/protection: None   Other Topics Concern    Not on file   Social History Narrative    Not on file     Social Determinants of Health     Financial Resource Strain: Not on file   Food Insecurity: Not on file   Transportation Needs: Not on file   Physical Activity: Not on file   Stress: Not on file   Social Connections: Not on file   Intimate Partner Violence: Not on file   Housing Stability: Not on file         ROS   Review of Systems   Constitutional: Negative. HENT: Negative. Eyes: Negative. Respiratory: Negative. Cardiovascular: Negative. Gastrointestinal: Negative. Genitourinary: Negative. Musculoskeletal: Negative. Skin: Negative. Neurological: Negative. Endo/Heme/Allergies: Negative. Psychiatric/Behavioral: Negative. /78   Ht 5' 2\" (1.575 m)   Wt 203 lb 6 oz (92.3 kg)   BMI 37.20 kg/m²    OBGyn Exam   Constitutional  Appearance: well-nourished, well developed, alert, in no acute distress    HENT  Head and Face: appears normal    Neck  Inspection/Palpation: normal appearance, no masses or tenderness  Lymph Nodes: no lymphadenopathy present  Thyroid: gland size normal, nontender, no nodules or masses present on palpation    Breasts  Symmetric, no palpable masses, no tenderness, no skin changes, no nipple abnormality, no nipple discharge, no lymphadenopathy. Chest  Respiratory Effort: breathing labored  Auscultation: normal breath sounds    Cardiovascular  Heart:   Auscultation: regular rate and rhythm without murmur    Gastrointestinal  Abdominal Examination: abdomen non-tender to palpation, normal bowel sounds, no masses present  Liver and spleen: no hepatomegaly present, spleen not palpable  Hernias: no hernias identified    Genitourinary  External Genitalia: normal appearance for age, no discharge present, no tenderness present, no inflammatory lesions present, no masses present, no atrophy present  Vagina: normal vaginal vault without central or paravaginal defects, no discharge present, no inflammatory lesions present, no masses present  Bladder: non-tender to palpation  Urethra: appears normal  Cervix: normal   Uterus: normal size, shape and consistency  Adnexa: no adnexal tenderness present, no adnexal masses present  Perineum: perineum within normal limits, no evidence of trauma, no rashes or skin lesions present  Anus: anus within normal limits, no hemorrhoids present  Inguinal Lymph Nodes: no lymphadenopathy present    Skin  General Inspection: no rash, no lesions identified    Neurologic/Psychiatric  Mental Status:  Orientation: grossly oriented to person, place and time  Mood and Affect: mood normal, affect appropriate    No results found for this visit on 03/15/23. Orders Placed This Encounter    norethindrone-e.estradioL-iron (Lo Loestrin Fe) 1 mg-10 mcg (24)/10 mcg (2) tab     Sig: Take 1 Tablet by mouth daily for 30 days. Dispense:  1 Dose Pack     Refill:  11    PAP IG, CT-NG-TV, RFX APTIMA HPV ASCUS ([id number])     Order Specific Question:   Pap Source? Answer:   Cervical     Order Specific Question:   Total Hysterectomy? Answer:   No     Order Specific Question:   Supracervical Hysterectomy? Answer:   No     Order Specific Question:   Post Menopausal?     Answer:   No     Order Specific Question:   Hormone Therapy? Answer:   No     Order Specific Question:   IUD? Answer:   No     Order Specific Question:   Abnormal Bleeding? Answer:   No     Order Specific Question:   Pregnant     Answer:   No     Order Specific Question:   Post Partum? Answer:   No     36 YO ANNUAL    1. Pap smear, as part of routine gynecological examination    - PAP IG, CT-NG-TV, RFX APTIMA HPV ASCUS ([id number])    2.  Screening for human papillomavirus    - PAP IG, CT-NG-TV, RFX APTIMA HPV ASCUS ([id number])    3. Screening for venereal disease    - PAP IG, CT-NG-TV, RFX APTIMA HPV ASCUS ([id number])    4. DUB (dysfunctional uterine bleeding)    - norethindrone-e.estradioL-iron (Lo Loestrin Fe) 1 mg-10 mcg (24)/10 mcg (2) tab; Take 1 Tablet by mouth daily for 30 days. Dispense: 1 Dose Pack;  Refill: 11

## 2023-03-19 LAB
C TRACH RRNA CVX QL NAA+PROBE: NEGATIVE
CYTOLOGIST CVX/VAG CYTO: NORMAL
CYTOLOGY CVX/VAG DOC CYTO: NORMAL
CYTOLOGY CVX/VAG DOC THIN PREP: NORMAL
DX ICD CODE: NORMAL
LABCORP, 190119: NORMAL
Lab: NORMAL
N GONORRHOEA RRNA CVX QL NAA+PROBE: NEGATIVE
OTHER STN SPEC: NORMAL
STAT OF ADQ CVX/VAG CYTO-IMP: NORMAL
T VAGINALIS RRNA SPEC QL NAA+PROBE: NEGATIVE

## 2023-05-19 RX ORDER — ASPIRIN 81 MG/1
81 TABLET ORAL DAILY
COMMUNITY
Start: 2022-06-23

## 2023-05-19 RX ORDER — IBUPROFEN 800 MG/1
800 TABLET ORAL EVERY 8 HOURS PRN
COMMUNITY
Start: 2022-12-30

## 2023-05-19 RX ORDER — LABETALOL 300 MG/1
300 TABLET, FILM COATED ORAL EVERY 8 HOURS
COMMUNITY
Start: 2023-01-16

## 2023-05-19 RX ORDER — LISINOPRIL 20 MG/1
20 TABLET ORAL DAILY
COMMUNITY
Start: 2023-01-09

## 2023-05-19 RX ORDER — OMEPRAZOLE 40 MG/1
CAPSULE, DELAYED RELEASE ORAL
COMMUNITY

## 2023-05-19 RX ORDER — HYDROCHLOROTHIAZIDE 25 MG/1
25 TABLET ORAL DAILY
COMMUNITY
Start: 2023-01-09

## 2023-05-19 RX ORDER — ACETAMINOPHEN AND CODEINE PHOSPHATE 120; 12 MG/5ML; MG/5ML
0.35 SOLUTION ORAL DAILY
COMMUNITY
Start: 2023-02-15 | End: 2023-07-10 | Stop reason: SDUPTHER

## 2023-07-07 ENCOUNTER — TELEPHONE (OUTPATIENT)
Age: 38
End: 2023-07-07

## 2023-07-10 DIAGNOSIS — N92.6 IRREGULAR MENSES: Primary | ICD-10-CM

## 2023-07-10 RX ORDER — ACETAMINOPHEN AND CODEINE PHOSPHATE 120; 12 MG/5ML; MG/5ML
0.35 SOLUTION ORAL DAILY
Qty: 30 TABLET | Refills: 5 | Status: SHIPPED | OUTPATIENT
Start: 2023-07-10

## 2023-07-12 NOTE — TELEPHONE ENCOUNTER
Spoke with patient made aware that [facility] has [facility] and that if any issues to let us know so that we can send to different pharmacy. She also updated her phone number she stated that [phone] will be her new number starting at the end of this month. Number added to file.

## 2023-07-30 DIAGNOSIS — N92.6 IRREGULAR MENSES: ICD-10-CM

## 2023-07-31 RX ORDER — ACETAMINOPHEN AND CODEINE PHOSPHATE 120; 12 MG/5ML; MG/5ML
SOLUTION ORAL
Qty: 28 TABLET | Refills: 0 | OUTPATIENT
Start: 2023-07-31

## 2023-08-04 DIAGNOSIS — N92.6 IRREGULAR MENSES: ICD-10-CM

## 2023-08-04 RX ORDER — ACETAMINOPHEN AND CODEINE PHOSPHATE 120; 12 MG/5ML; MG/5ML
SOLUTION ORAL
Qty: 28 TABLET | Refills: 0 | OUTPATIENT
Start: 2023-08-04

## 2023-08-07 RX ORDER — LABETALOL 300 MG/1
TABLET, FILM COATED ORAL
Qty: 90 TABLET | Refills: 0 | OUTPATIENT
Start: 2023-08-07

## 2023-08-30 ENCOUNTER — TELEPHONE (OUTPATIENT)
Age: 38
End: 2023-08-30

## 2023-08-30 NOTE — TELEPHONE ENCOUNTER
Returned the patient's call and advised her I had spoken with [facility] and her prescription refill for Micronor is currently being processed and she should be able to pick it up today. Quiet play/No excercise/No heavy lifting/No sports/gym/No weight bearing/Elevate extremity

## 2024-01-15 DIAGNOSIS — N92.6 IRREGULAR MENSES: ICD-10-CM

## 2024-01-15 RX ORDER — ACETAMINOPHEN AND CODEINE PHOSPHATE 120; 12 MG/5ML; MG/5ML
1 SOLUTION ORAL DAILY
Qty: 28 TABLET | Refills: 2 | Status: SHIPPED | OUTPATIENT
Start: 2024-01-15

## 2024-02-27 RX ORDER — IBUPROFEN 800 MG/1
800 TABLET ORAL EVERY 8 HOURS PRN
Qty: 30 TABLET | Refills: 0 | OUTPATIENT
Start: 2024-02-27

## 2024-04-08 DIAGNOSIS — N92.6 IRREGULAR MENSES: ICD-10-CM

## 2024-04-08 RX ORDER — ACETAMINOPHEN AND CODEINE PHOSPHATE 120; 12 MG/5ML; MG/5ML
1 SOLUTION ORAL DAILY
Qty: 28 TABLET | Refills: 0 | Status: SHIPPED | OUTPATIENT
Start: 2024-04-08

## 2024-05-06 DIAGNOSIS — N92.6 IRREGULAR MENSES: ICD-10-CM

## 2024-05-06 RX ORDER — ACETAMINOPHEN AND CODEINE PHOSPHATE 120; 12 MG/5ML; MG/5ML
SOLUTION ORAL
Qty: 28 TABLET | Refills: 0 | Status: SHIPPED | OUTPATIENT
Start: 2024-05-06

## 2024-05-28 ENCOUNTER — OFFICE VISIT (OUTPATIENT)
Age: 39
End: 2024-05-28
Payer: COMMERCIAL

## 2024-05-28 VITALS
SYSTOLIC BLOOD PRESSURE: 138 MMHG | HEIGHT: 62 IN | DIASTOLIC BLOOD PRESSURE: 78 MMHG | BODY MASS INDEX: 38.5 KG/M2 | WEIGHT: 209.25 LBS

## 2024-05-28 DIAGNOSIS — Z01.419 PAP SMEAR, AS PART OF ROUTINE GYNECOLOGICAL EXAMINATION: Primary | ICD-10-CM

## 2024-05-28 DIAGNOSIS — Z00.00 ANNUAL VISIT FOR GENERAL ADULT MEDICAL EXAMINATION WITHOUT ABNORMAL FINDINGS: ICD-10-CM

## 2024-05-28 DIAGNOSIS — Z11.51 SCREENING FOR HUMAN PAPILLOMAVIRUS: ICD-10-CM

## 2024-05-28 DIAGNOSIS — N92.6 IRREGULAR MENSES: ICD-10-CM

## 2024-05-28 DIAGNOSIS — Z11.3 SCREENING FOR VENEREAL DISEASE: ICD-10-CM

## 2024-05-28 PROCEDURE — 3078F DIAST BP <80 MM HG: CPT | Performed by: OBSTETRICS & GYNECOLOGY

## 2024-05-28 PROCEDURE — 3075F SYST BP GE 130 - 139MM HG: CPT | Performed by: OBSTETRICS & GYNECOLOGY

## 2024-05-28 PROCEDURE — 99396 PREV VISIT EST AGE 40-64: CPT | Performed by: OBSTETRICS & GYNECOLOGY

## 2024-05-28 RX ORDER — ACETAMINOPHEN AND CODEINE PHOSPHATE 120; 12 MG/5ML; MG/5ML
SOLUTION ORAL
Qty: 28 TABLET | Refills: 11 | Status: SHIPPED | OUTPATIENT
Start: 2024-05-28

## 2024-05-28 NOTE — PROGRESS NOTES
Lanesha Graves is a 38 y.o. female who presents today for the following:  Chief Complaint   Patient presents with    Annual Exam     Pt presents for annual exam with no complaints//MKeeley         No Known Allergies    Current Outpatient Medications   Medication Sig Dispense Refill    norethindrone (MICRONOR) 0.35 MG tablet TAKE 1 TABLET BY MOUTH ONCE DAILY.  NEEDS TO BE SEEN FOR FURTHER REFILLS 28 tablet 11    labetalol (NORMODYNE) 300 MG tablet TAKE 1 TABLET BY MOUTH EVERY 8 HOURS 90 tablet 0    aspirin 81 MG EC tablet Take 1 tablet by mouth daily      hydroCHLOROthiazide (HYDRODIURIL) 25 MG tablet Take 1 tablet by mouth daily      ibuprofen (ADVIL;MOTRIN) 800 MG tablet Take 1 tablet by mouth every 8 hours as needed      lisinopril (PRINIVIL;ZESTRIL) 20 MG tablet Take 1 tablet by mouth daily      omeprazole (PRILOSEC) 40 MG delayed release capsule 1 capsule       No current facility-administered medications for this visit.       Past Medical History:   Diagnosis Date    Anxiety     GERD (gastroesophageal reflux disease)     Headache     Hypercholesterolemia     Hypertension     Obesity     Phobia     Psychiatric disorder        Past Surgical History:   Procedure Laterality Date     SECTION      CRYOCAUTERY OF CERVIX      TONSILLECTOMY AND ADENOIDECTOMY         Family History   Problem Relation Age of Onset    Hypertension Paternal Grandmother     Hypertension Maternal Grandmother     Diabetes Maternal Grandmother     Diabetes Paternal Grandmother        Social History     Socioeconomic History    Marital status: Single     Spouse name: Not on file    Number of children: Not on file    Years of education: Not on file    Highest education level: Not on file   Occupational History    Not on file   Tobacco Use    Smoking status: Former    Smokeless tobacco: Never   Substance and Sexual Activity    Alcohol use: Not Currently    Drug use: Never    Sexual activity: Not on file   Other Topics Concern    Not on

## 2024-06-02 LAB
., LABCORP: NORMAL
C TRACH RRNA CVX QL NAA+PROBE: NEGATIVE
CYTOLOGIST CVX/VAG CYTO: NORMAL
CYTOLOGY CVX/VAG DOC CYTO: NORMAL
CYTOLOGY CVX/VAG DOC THIN PREP: NORMAL
DX ICD CODE: NORMAL
Lab: NORMAL
N GONORRHOEA RRNA CVX QL NAA+PROBE: NEGATIVE
OTHER STN SPEC: NORMAL
STAT OF ADQ CVX/VAG CYTO-IMP: NORMAL
T VAGINALIS RRNA SPEC QL NAA+PROBE: NEGATIVE

## 2024-10-30 ENCOUNTER — TRANSCRIBE ORDERS (OUTPATIENT)
Facility: HOSPITAL | Age: 39
End: 2024-10-30

## 2024-10-30 DIAGNOSIS — D49.0 NEOPLASM OF UNSPECIFIED BEHAVIOR OF DIGESTIVE SYSTEM: Primary | ICD-10-CM

## 2024-11-05 ENCOUNTER — HOSPITAL ENCOUNTER (OUTPATIENT)
Facility: HOSPITAL | Age: 39
Discharge: HOME | End: 2024-11-08
Payer: COMMERCIAL

## 2024-11-05 DIAGNOSIS — D49.0 NEOPLASM OF UNSPECIFIED BEHAVIOR OF DIGESTIVE SYSTEM: ICD-10-CM

## 2024-11-05 PROCEDURE — 74183 MRI ABD W/O CNTR FLWD CNTR: CPT

## 2024-11-05 PROCEDURE — A9577 INJ MULTIHANCE: HCPCS

## 2024-11-05 PROCEDURE — 6360000004 HC RX CONTRAST MEDICATION

## 2024-11-05 RX ADMIN — GADOBENATE DIMEGLUMINE 19 ML: 529 INJECTION, SOLUTION INTRAVENOUS at 08:58

## 2024-11-11 ENCOUNTER — OFFICE VISIT (OUTPATIENT)
Age: 39
End: 2024-11-11
Payer: COMMERCIAL

## 2024-11-11 VITALS
SYSTOLIC BLOOD PRESSURE: 121 MMHG | WEIGHT: 207.25 LBS | BODY MASS INDEX: 38.14 KG/M2 | DIASTOLIC BLOOD PRESSURE: 75 MMHG | HEIGHT: 62 IN

## 2024-11-11 DIAGNOSIS — R10.2 PELVIC PAIN IN FEMALE: ICD-10-CM

## 2024-11-11 DIAGNOSIS — N83.201 CYSTS OF BOTH OVARIES: ICD-10-CM

## 2024-11-11 DIAGNOSIS — N83.209 CYST OF OVARY, UNSPECIFIED LATERALITY: Primary | ICD-10-CM

## 2024-11-11 DIAGNOSIS — N83.202 CYSTS OF BOTH OVARIES: ICD-10-CM

## 2024-11-11 PROCEDURE — 3074F SYST BP LT 130 MM HG: CPT | Performed by: OBSTETRICS & GYNECOLOGY

## 2024-11-11 PROCEDURE — 99214 OFFICE O/P EST MOD 30 MIN: CPT | Performed by: OBSTETRICS & GYNECOLOGY

## 2024-11-11 PROCEDURE — 3078F DIAST BP <80 MM HG: CPT | Performed by: OBSTETRICS & GYNECOLOGY

## 2024-11-11 NOTE — PROGRESS NOTES
Lanesha Graves is a 38 y.o. female who presents today for the following:  Chief Complaint   Patient presents with    Follow-Up from Hospital     Pt presents for hospital follow up for ovarian cyst with complaints of pelvic pain//MKeeley         No Known Allergies    Current Outpatient Medications   Medication Sig Dispense Refill    norethindrone (MICRONOR) 0.35 MG tablet TAKE 1 TABLET BY MOUTH ONCE DAILY.  NEEDS TO BE SEEN FOR FURTHER REFILLS 28 tablet 11    labetalol (NORMODYNE) 300 MG tablet TAKE 1 TABLET BY MOUTH EVERY 8 HOURS 90 tablet 0    aspirin 81 MG EC tablet Take 1 tablet by mouth daily      hydroCHLOROthiazide (HYDRODIURIL) 25 MG tablet Take 1 tablet by mouth daily      ibuprofen (ADVIL;MOTRIN) 800 MG tablet Take 1 tablet by mouth every 8 hours as needed      lisinopril (PRINIVIL;ZESTRIL) 20 MG tablet Take 1 tablet by mouth daily      omeprazole (PRILOSEC) 40 MG delayed release capsule 1 capsule       No current facility-administered medications for this visit.       Past Medical History:   Diagnosis Date    Anxiety     GERD (gastroesophageal reflux disease)     Headache     Hypercholesterolemia     Hypertension     Obesity     Phobia     Psychiatric disorder        Past Surgical History:   Procedure Laterality Date     SECTION      CRYOCAUTERY OF CERVIX      TONSILLECTOMY AND ADENOIDECTOMY         Family History   Problem Relation Age of Onset    Hypertension Paternal Grandmother     Diabetes Paternal Grandmother     Hypertension Maternal Grandmother     Diabetes Maternal Grandmother        Social History     Socioeconomic History    Marital status: Single     Spouse name: Not on file    Number of children: Not on file    Years of education: Not on file    Highest education level: Not on file   Occupational History    Not on file   Tobacco Use    Smoking status: Former    Smokeless tobacco: Never   Substance and Sexual Activity    Alcohol use: Never    Drug use: Never    Sexual activity: Yes

## 2024-11-12 ENCOUNTER — TELEPHONE (OUTPATIENT)
Age: 39
End: 2024-11-12

## 2024-11-12 NOTE — TELEPHONE ENCOUNTER
Pt calling and stated that Dr. Mcgarry told her that someone from the office was going to give her a call to schedule a procedure appt.ccm

## 2024-11-13 ENCOUNTER — PREP FOR PROCEDURE (OUTPATIENT)
Age: 39
End: 2024-11-13

## 2024-11-13 DIAGNOSIS — N83.202 OVARIAN CYST, BILATERAL: ICD-10-CM

## 2024-11-13 DIAGNOSIS — N83.201 OVARIAN CYST, BILATERAL: ICD-10-CM

## 2024-11-13 DIAGNOSIS — R10.2 PELVIC PAIN IN FEMALE: ICD-10-CM

## 2024-11-13 DIAGNOSIS — Z01.818 PRE-OP EXAM: Primary | ICD-10-CM

## 2024-11-18 ENCOUNTER — HOSPITAL ENCOUNTER (OUTPATIENT)
Facility: HOSPITAL | Age: 39
Discharge: HOME | End: 2024-11-21
Payer: COMMERCIAL

## 2024-11-18 VITALS
BODY MASS INDEX: 37.73 KG/M2 | HEIGHT: 62 IN | TEMPERATURE: 98.3 F | RESPIRATION RATE: 16 BRPM | SYSTOLIC BLOOD PRESSURE: 134 MMHG | OXYGEN SATURATION: 100 % | DIASTOLIC BLOOD PRESSURE: 92 MMHG | WEIGHT: 205 LBS | HEART RATE: 76 BPM

## 2024-11-18 LAB
ABO + RH BLD: NORMAL
ALBUMIN SERPL-MCNC: 3.7 G/DL (ref 3.5–5)
ALBUMIN/GLOB SERPL: 1.1 (ref 1.1–2.2)
ALP SERPL-CCNC: 84 U/L (ref 45–117)
ALT SERPL-CCNC: 19 U/L (ref 12–78)
ANION GAP SERPL CALC-SCNC: 5 MMOL/L (ref 2–12)
APPEARANCE UR: CLEAR
AST SERPL W P-5'-P-CCNC: 10 U/L (ref 15–37)
BACTERIA URNS QL MICRO: NEGATIVE /HPF
BILIRUB SERPL-MCNC: 0.4 MG/DL (ref 0.2–1)
BILIRUB UR QL: NEGATIVE
BLOOD GROUP ANTIBODIES SERPL: NEGATIVE
BUN SERPL-MCNC: 7 MG/DL (ref 6–20)
BUN/CREAT SERPL: 9 (ref 12–20)
CA-I BLD-MCNC: 9.3 MG/DL (ref 8.5–10.1)
CHLORIDE SERPL-SCNC: 106 MMOL/L (ref 97–108)
CO2 SERPL-SCNC: 27 MMOL/L (ref 21–32)
COLOR UR: ABNORMAL
CREAT SERPL-MCNC: 0.82 MG/DL (ref 0.55–1.02)
EKG ATRIAL RATE: 67 BPM
EKG DIAGNOSIS: NORMAL
EKG P AXIS: 27 DEGREES
EKG P-R INTERVAL: 112 MS
EKG Q-T INTERVAL: 396 MS
EKG QRS DURATION: 68 MS
EKG QTC CALCULATION (BAZETT): 418 MS
EKG R AXIS: 3 DEGREES
EKG T AXIS: 11 DEGREES
EKG VENTRICULAR RATE: 67 BPM
EPITH CASTS URNS QL MICRO: ABNORMAL /LPF
ERYTHROCYTE [DISTWIDTH] IN BLOOD BY AUTOMATED COUNT: 12.9 % (ref 11.5–14.5)
EST. AVERAGE GLUCOSE BLD GHB EST-MCNC: 114 MG/DL
GLOBULIN SER CALC-MCNC: 3.5 G/DL (ref 2–4)
GLUCOSE SERPL-MCNC: 113 MG/DL (ref 65–100)
GLUCOSE UR STRIP.AUTO-MCNC: NEGATIVE MG/DL
HBA1C MFR BLD: 5.6 % (ref 4–5.6)
HCT VFR BLD AUTO: 36.2 % (ref 35–47)
HGB BLD-MCNC: 11.8 G/DL (ref 11.5–16)
HGB UR QL STRIP: NEGATIVE
KETONES UR QL STRIP.AUTO: 5 MG/DL
LEUKOCYTE ESTERASE UR QL STRIP.AUTO: NEGATIVE
MCH RBC QN AUTO: 28.9 PG (ref 26–34)
MCHC RBC AUTO-ENTMCNC: 32.6 G/DL (ref 30–36.5)
MCV RBC AUTO: 88.5 FL (ref 80–99)
MUCOUS THREADS URNS QL MICRO: ABNORMAL /LPF
NITRITE UR QL STRIP.AUTO: NEGATIVE
NRBC # BLD: 0 K/UL (ref 0–0.01)
NRBC BLD-RTO: 0 PER 100 WBC
PH UR STRIP: 5 (ref 5–8)
PLATELET # BLD AUTO: 293 K/UL (ref 150–400)
PMV BLD AUTO: 10.1 FL (ref 8.9–12.9)
POTASSIUM SERPL-SCNC: 3.9 MMOL/L (ref 3.5–5.1)
PROT SERPL-MCNC: 7.2 G/DL (ref 6.4–8.2)
PROT UR STRIP-MCNC: NEGATIVE MG/DL
RBC # BLD AUTO: 4.09 M/UL (ref 3.8–5.2)
RBC #/AREA URNS HPF: ABNORMAL /HPF (ref 0–5)
SODIUM SERPL-SCNC: 138 MMOL/L (ref 136–145)
SP GR UR REFRACTOMETRY: 1.02 (ref 1–1.03)
SPECIMEN EXP DATE BLD: NORMAL
URINE CULTURE IF INDICATED: ABNORMAL
UROBILINOGEN UR QL STRIP.AUTO: 0.1 EU/DL (ref 0.1–1)
WBC # BLD AUTO: 8.8 K/UL (ref 3.6–11)
WBC URNS QL MICRO: ABNORMAL /HPF (ref 0–4)

## 2024-11-18 PROCEDURE — 36415 COLL VENOUS BLD VENIPUNCTURE: CPT

## 2024-11-18 PROCEDURE — 93005 ELECTROCARDIOGRAM TRACING: CPT | Performed by: ANESTHESIOLOGY

## 2024-11-18 PROCEDURE — 86900 BLOOD TYPING SEROLOGIC ABO: CPT

## 2024-11-18 PROCEDURE — 86901 BLOOD TYPING SEROLOGIC RH(D): CPT

## 2024-11-18 PROCEDURE — 83036 HEMOGLOBIN GLYCOSYLATED A1C: CPT

## 2024-11-18 PROCEDURE — 80053 COMPREHEN METABOLIC PANEL: CPT

## 2024-11-18 PROCEDURE — 85027 COMPLETE CBC AUTOMATED: CPT

## 2024-11-18 PROCEDURE — 81001 URINALYSIS AUTO W/SCOPE: CPT

## 2024-11-18 PROCEDURE — 86850 RBC ANTIBODY SCREEN: CPT

## 2024-11-18 RX ORDER — ATORVASTATIN CALCIUM 40 MG/1
40 TABLET, FILM COATED ORAL DAILY
COMMUNITY

## 2024-11-18 ASSESSMENT — PAIN DESCRIPTION - DESCRIPTORS: DESCRIPTORS: ACHING

## 2024-11-18 ASSESSMENT — PAIN DESCRIPTION - LOCATION: LOCATION: ABDOMEN

## 2024-11-18 ASSESSMENT — PAIN SCALES - GENERAL: PAINLEVEL_OUTOF10: 5

## 2024-11-18 ASSESSMENT — PAIN DESCRIPTION - ORIENTATION: ORIENTATION: LEFT

## 2024-11-18 ASSESSMENT — PAIN DESCRIPTION - PAIN TYPE: TYPE: ACUTE PAIN

## 2024-11-18 NOTE — PERIOP NOTE
Pt states she has been having chest pain at least once a month for the past several months. She states she last experienced chest pain Friday that lasted a couple hours, denies SOB. Appt made with Dr. Garcia 11/19/24 at 3:45 per patient request. Cardiac Clearance form given to patient to be completed.    11/20/2024  - Reviewed Dr. Garcia's office note and confirmed with patient. She will need echo & stress test prior for cardiac clearance.     [id number] - Message left for Ana Cristina/Dr. Mcgarry.

## 2024-11-20 ENCOUNTER — TELEPHONE (OUTPATIENT)
Age: 39
End: 2024-11-20

## 2024-11-20 NOTE — TELEPHONE ENCOUNTER
Pt called and left a VM stating that her next appt will be December 17th and needs to have a new surgery date

## 2024-11-25 RX ORDER — POLYETHYLENE GLYCOL 3350, SODIUM SULFATE ANHYDROUS, SODIUM BICARBONATE, SODIUM CHLORIDE, POTASSIUM CHLORIDE 236; 22.74; 6.74; 5.86; 2.97 G/4L; G/4L; G/4L; G/4L; G/4L
4 POWDER, FOR SOLUTION ORAL ONCE
Qty: 1 EACH | Refills: 0 | Status: SHIPPED | OUTPATIENT
Start: 2024-11-25 | End: 2024-11-25

## 2024-12-04 ENCOUNTER — TELEPHONE (OUTPATIENT)
Age: 39
End: 2024-12-04

## 2024-12-04 NOTE — TELEPHONE ENCOUNTER
Pt called wanting to know if she's still having her surgery tomorrow. Pt inquiring about the stress test and was the results okay.ccm

## 2024-12-05 ENCOUNTER — ANESTHESIA EVENT (OUTPATIENT)
Facility: HOSPITAL | Age: 39
End: 2024-12-05
Payer: COMMERCIAL

## 2024-12-05 ENCOUNTER — ANESTHESIA (OUTPATIENT)
Facility: HOSPITAL | Age: 39
End: 2024-12-05
Payer: COMMERCIAL

## 2024-12-05 ENCOUNTER — HOSPITAL ENCOUNTER (OUTPATIENT)
Facility: HOSPITAL | Age: 39
Setting detail: OUTPATIENT SURGERY
Discharge: HOME | End: 2024-12-05
Attending: OBSTETRICS & GYNECOLOGY | Admitting: OBSTETRICS & GYNECOLOGY
Payer: COMMERCIAL

## 2024-12-05 VITALS
DIASTOLIC BLOOD PRESSURE: 63 MMHG | RESPIRATION RATE: 18 BRPM | HEART RATE: 87 BPM | TEMPERATURE: 97.5 F | SYSTOLIC BLOOD PRESSURE: 104 MMHG | OXYGEN SATURATION: 99 %

## 2024-12-05 DIAGNOSIS — N83.202 OVARIAN CYST, BILATERAL: ICD-10-CM

## 2024-12-05 DIAGNOSIS — G89.18 POST-OP PAIN: Primary | ICD-10-CM

## 2024-12-05 DIAGNOSIS — R10.2 PELVIC PAIN IN FEMALE: ICD-10-CM

## 2024-12-05 DIAGNOSIS — N83.201 OVARIAN CYST, BILATERAL: ICD-10-CM

## 2024-12-05 LAB
ABO + RH BLD: NORMAL
ANION GAP BLD CALC-SCNC: 13
BLOOD GROUP ANTIBODIES SERPL: NEGATIVE
CA-I BLD-MCNC: 1.25 MMOL/L (ref 1.12–1.32)
CHLORIDE BLD-SCNC: 100 MMOL/L (ref 98–107)
CO2 BLD-SCNC: 27 MMOL/L
CREAT UR-MCNC: 0.79 MG/DL (ref 0.6–1.3)
GLUCOSE BLD STRIP.AUTO-MCNC: 130 MG/DL (ref 65–100)
HCG UR QL: NEGATIVE
POTASSIUM BLD-SCNC: 3.7 MMOL/L (ref 3.5–5.5)
SODIUM BLD-SCNC: 139 MMOL/L (ref 136–145)
SPECIMEN EXP DATE BLD: NORMAL

## 2024-12-05 PROCEDURE — 6370000000 HC RX 637 (ALT 250 FOR IP): Performed by: ANESTHESIOLOGY

## 2024-12-05 PROCEDURE — 88305 TISSUE EXAM BY PATHOLOGIST: CPT

## 2024-12-05 PROCEDURE — 86900 BLOOD TYPING SEROLOGIC ABO: CPT

## 2024-12-05 PROCEDURE — 3600000019 HC SURGERY ROBOT ADDTL 15MIN: Performed by: OBSTETRICS & GYNECOLOGY

## 2024-12-05 PROCEDURE — 86901 BLOOD TYPING SEROLOGIC RH(D): CPT

## 2024-12-05 PROCEDURE — 58662 LAPAROSCOPY EXCISE LESIONS: CPT | Performed by: OBSTETRICS & GYNECOLOGY

## 2024-12-05 PROCEDURE — 81025 URINE PREGNANCY TEST: CPT

## 2024-12-05 PROCEDURE — 7100000010 HC PHASE II RECOVERY - FIRST 15 MIN: Performed by: OBSTETRICS & GYNECOLOGY

## 2024-12-05 PROCEDURE — 2500000003 HC RX 250 WO HCPCS

## 2024-12-05 PROCEDURE — 6360000002 HC RX W HCPCS

## 2024-12-05 PROCEDURE — 7100000011 HC PHASE II RECOVERY - ADDTL 15 MIN: Performed by: OBSTETRICS & GYNECOLOGY

## 2024-12-05 PROCEDURE — 86850 RBC ANTIBODY SCREEN: CPT

## 2024-12-05 PROCEDURE — S2900 ROBOTIC SURGICAL SYSTEM: HCPCS | Performed by: OBSTETRICS & GYNECOLOGY

## 2024-12-05 PROCEDURE — 2709999900 HC NON-CHARGEABLE SUPPLY: Performed by: OBSTETRICS & GYNECOLOGY

## 2024-12-05 PROCEDURE — 2580000003 HC RX 258: Performed by: OBSTETRICS & GYNECOLOGY

## 2024-12-05 PROCEDURE — 36415 COLL VENOUS BLD VENIPUNCTURE: CPT

## 2024-12-05 PROCEDURE — 3700000000 HC ANESTHESIA ATTENDED CARE: Performed by: OBSTETRICS & GYNECOLOGY

## 2024-12-05 PROCEDURE — 6360000002 HC RX W HCPCS: Performed by: OBSTETRICS & GYNECOLOGY

## 2024-12-05 PROCEDURE — 7100000001 HC PACU RECOVERY - ADDTL 15 MIN: Performed by: OBSTETRICS & GYNECOLOGY

## 2024-12-05 PROCEDURE — 80047 BASIC METABLC PNL IONIZED CA: CPT

## 2024-12-05 PROCEDURE — 3700000001 HC ADD 15 MINUTES (ANESTHESIA): Performed by: OBSTETRICS & GYNECOLOGY

## 2024-12-05 PROCEDURE — 3600000009 HC SURGERY ROBOT BASE: Performed by: OBSTETRICS & GYNECOLOGY

## 2024-12-05 PROCEDURE — 7100000000 HC PACU RECOVERY - FIRST 15 MIN: Performed by: OBSTETRICS & GYNECOLOGY

## 2024-12-05 RX ORDER — LORAZEPAM 2 MG/ML
0.5 INJECTION INTRAMUSCULAR
Status: DISCONTINUED | OUTPATIENT
Start: 2024-12-05 | End: 2024-12-05 | Stop reason: HOSPADM

## 2024-12-05 RX ORDER — SODIUM CHLORIDE 9 MG/ML
INJECTION, SOLUTION INTRAVENOUS PRN
Status: DISCONTINUED | OUTPATIENT
Start: 2024-12-05 | End: 2024-12-05 | Stop reason: HOSPADM

## 2024-12-05 RX ORDER — DIPHENHYDRAMINE HYDROCHLORIDE 50 MG/ML
12.5 INJECTION INTRAMUSCULAR; INTRAVENOUS
Status: DISCONTINUED | OUTPATIENT
Start: 2024-12-05 | End: 2024-12-05 | Stop reason: HOSPADM

## 2024-12-05 RX ORDER — ONDANSETRON 2 MG/ML
4 INJECTION INTRAMUSCULAR; INTRAVENOUS
Status: DISCONTINUED | OUTPATIENT
Start: 2024-12-05 | End: 2024-12-05 | Stop reason: HOSPADM

## 2024-12-05 RX ORDER — OXYCODONE HYDROCHLORIDE 5 MG/1
10 TABLET ORAL PRN
Status: COMPLETED | OUTPATIENT
Start: 2024-12-05 | End: 2024-12-05

## 2024-12-05 RX ORDER — SODIUM CHLORIDE 0.9 % (FLUSH) 0.9 %
5-40 SYRINGE (ML) INJECTION PRN
Status: DISCONTINUED | OUTPATIENT
Start: 2024-12-05 | End: 2024-12-05 | Stop reason: HOSPADM

## 2024-12-05 RX ORDER — MAGNESIUM SULFATE IN WATER 40 MG/ML
INJECTION, SOLUTION INTRAVENOUS
Status: DISCONTINUED | OUTPATIENT
Start: 2024-12-05 | End: 2024-12-05 | Stop reason: SDUPTHER

## 2024-12-05 RX ORDER — SUCCINYLCHOLINE/SOD CL,ISO/PF 200MG/10ML
SYRINGE (ML) INTRAVENOUS
Status: DISCONTINUED | OUTPATIENT
Start: 2024-12-05 | End: 2024-12-05 | Stop reason: SDUPTHER

## 2024-12-05 RX ORDER — SODIUM CHLORIDE 0.9 % (FLUSH) 0.9 %
5-40 SYRINGE (ML) INJECTION EVERY 12 HOURS SCHEDULED
Status: DISCONTINUED | OUTPATIENT
Start: 2024-12-05 | End: 2024-12-05 | Stop reason: HOSPADM

## 2024-12-05 RX ORDER — BUPIVACAINE HYDROCHLORIDE AND EPINEPHRINE 5; 5 MG/ML; UG/ML
INJECTION, SOLUTION PERINEURAL PRN
Status: DISCONTINUED | OUTPATIENT
Start: 2024-12-05 | End: 2024-12-05 | Stop reason: HOSPADM

## 2024-12-05 RX ORDER — OXYCODONE HYDROCHLORIDE 5 MG/1
5 TABLET ORAL PRN
Status: COMPLETED | OUTPATIENT
Start: 2024-12-05 | End: 2024-12-05

## 2024-12-05 RX ORDER — MIDAZOLAM HYDROCHLORIDE 1 MG/ML
INJECTION, SOLUTION INTRAMUSCULAR; INTRAVENOUS
Status: DISCONTINUED | OUTPATIENT
Start: 2024-12-05 | End: 2024-12-05 | Stop reason: SDUPTHER

## 2024-12-05 RX ORDER — NALOXONE HYDROCHLORIDE 0.4 MG/ML
INJECTION, SOLUTION INTRAMUSCULAR; INTRAVENOUS; SUBCUTANEOUS PRN
Status: DISCONTINUED | OUTPATIENT
Start: 2024-12-05 | End: 2024-12-05 | Stop reason: HOSPADM

## 2024-12-05 RX ORDER — LIDOCAINE HYDROCHLORIDE 20 MG/ML
INJECTION, SOLUTION EPIDURAL; INFILTRATION; INTRACAUDAL; PERINEURAL
Status: DISCONTINUED | OUTPATIENT
Start: 2024-12-05 | End: 2024-12-05 | Stop reason: SDUPTHER

## 2024-12-05 RX ORDER — IPRATROPIUM BROMIDE AND ALBUTEROL SULFATE 2.5; .5 MG/3ML; MG/3ML
1 SOLUTION RESPIRATORY (INHALATION)
Status: DISCONTINUED | OUTPATIENT
Start: 2024-12-05 | End: 2024-12-05 | Stop reason: HOSPADM

## 2024-12-05 RX ORDER — IBUPROFEN 600 MG/1
600 TABLET, FILM COATED ORAL 3 TIMES DAILY PRN
Qty: 30 TABLET | Refills: 0 | Status: SHIPPED | OUTPATIENT
Start: 2024-12-05

## 2024-12-05 RX ORDER — GLUCAGON 1 MG/ML
1 KIT INJECTION PRN
Status: DISCONTINUED | OUTPATIENT
Start: 2024-12-05 | End: 2024-12-05 | Stop reason: HOSPADM

## 2024-12-05 RX ORDER — METOCLOPRAMIDE HYDROCHLORIDE 5 MG/ML
10 INJECTION INTRAMUSCULAR; INTRAVENOUS
Status: DISCONTINUED | OUTPATIENT
Start: 2024-12-05 | End: 2024-12-05 | Stop reason: HOSPADM

## 2024-12-05 RX ORDER — LABETALOL HYDROCHLORIDE 5 MG/ML
10 INJECTION, SOLUTION INTRAVENOUS
Status: DISCONTINUED | OUTPATIENT
Start: 2024-12-05 | End: 2024-12-05 | Stop reason: HOSPADM

## 2024-12-05 RX ORDER — ONDANSETRON 2 MG/ML
INJECTION INTRAMUSCULAR; INTRAVENOUS
Status: DISCONTINUED | OUTPATIENT
Start: 2024-12-05 | End: 2024-12-05 | Stop reason: SDUPTHER

## 2024-12-05 RX ORDER — SODIUM CHLORIDE, SODIUM LACTATE, POTASSIUM CHLORIDE, CALCIUM CHLORIDE 600; 310; 30; 20 MG/100ML; MG/100ML; MG/100ML; MG/100ML
INJECTION, SOLUTION INTRAVENOUS ONCE
Status: DISCONTINUED | OUTPATIENT
Start: 2024-12-05 | End: 2024-12-05 | Stop reason: HOSPADM

## 2024-12-05 RX ORDER — MEPERIDINE HYDROCHLORIDE 25 MG/ML
12.5 INJECTION INTRAMUSCULAR; INTRAVENOUS; SUBCUTANEOUS EVERY 5 MIN PRN
Status: DISCONTINUED | OUTPATIENT
Start: 2024-12-05 | End: 2024-12-05 | Stop reason: HOSPADM

## 2024-12-05 RX ORDER — METOCLOPRAMIDE HYDROCHLORIDE 5 MG/ML
INJECTION INTRAMUSCULAR; INTRAVENOUS
Status: DISCONTINUED | OUTPATIENT
Start: 2024-12-05 | End: 2024-12-05 | Stop reason: SDUPTHER

## 2024-12-05 RX ORDER — DEXAMETHASONE SODIUM PHOSPHATE 4 MG/ML
INJECTION, SOLUTION INTRA-ARTICULAR; INTRALESIONAL; INTRAMUSCULAR; INTRAVENOUS; SOFT TISSUE
Status: DISCONTINUED | OUTPATIENT
Start: 2024-12-05 | End: 2024-12-05 | Stop reason: SDUPTHER

## 2024-12-05 RX ORDER — DEXMEDETOMIDINE HYDROCHLORIDE 100 UG/ML
INJECTION, SOLUTION INTRAVENOUS
Status: DISCONTINUED | OUTPATIENT
Start: 2024-12-05 | End: 2024-12-05 | Stop reason: SDUPTHER

## 2024-12-05 RX ORDER — PROPOFOL 10 MG/ML
INJECTION, EMULSION INTRAVENOUS
Status: DISCONTINUED | OUTPATIENT
Start: 2024-12-05 | End: 2024-12-05 | Stop reason: SDUPTHER

## 2024-12-05 RX ORDER — SODIUM CHLORIDE 9 MG/ML
INJECTION, SOLUTION INTRAVENOUS CONTINUOUS
Status: DISCONTINUED | OUTPATIENT
Start: 2024-12-05 | End: 2024-12-05 | Stop reason: HOSPADM

## 2024-12-05 RX ORDER — FENTANYL CITRATE 0.05 MG/ML
50 INJECTION, SOLUTION INTRAMUSCULAR; INTRAVENOUS EVERY 5 MIN PRN
Status: DISCONTINUED | OUTPATIENT
Start: 2024-12-05 | End: 2024-12-05 | Stop reason: HOSPADM

## 2024-12-05 RX ORDER — KETOROLAC TROMETHAMINE 30 MG/ML
INJECTION, SOLUTION INTRAMUSCULAR; INTRAVENOUS
Status: DISCONTINUED | OUTPATIENT
Start: 2024-12-05 | End: 2024-12-05 | Stop reason: SDUPTHER

## 2024-12-05 RX ORDER — HYDROMORPHONE HYDROCHLORIDE 1 MG/ML
0.5 INJECTION, SOLUTION INTRAMUSCULAR; INTRAVENOUS; SUBCUTANEOUS EVERY 5 MIN PRN
Status: DISCONTINUED | OUTPATIENT
Start: 2024-12-05 | End: 2024-12-05 | Stop reason: HOSPADM

## 2024-12-05 RX ORDER — PHENYLEPHRINE HCL IN 0.9% NACL 1 MG/10 ML
SYRINGE (ML) INTRAVENOUS
Status: DISCONTINUED | OUTPATIENT
Start: 2024-12-05 | End: 2024-12-05 | Stop reason: SDUPTHER

## 2024-12-05 RX ORDER — HYDRALAZINE HYDROCHLORIDE 20 MG/ML
10 INJECTION INTRAMUSCULAR; INTRAVENOUS
Status: DISCONTINUED | OUTPATIENT
Start: 2024-12-05 | End: 2024-12-05 | Stop reason: HOSPADM

## 2024-12-05 RX ORDER — LIDOCAINE 4 G/G
1 PATCH TOPICAL AS NEEDED
Status: DISCONTINUED | OUTPATIENT
Start: 2024-12-05 | End: 2024-12-05 | Stop reason: HOSPADM

## 2024-12-05 RX ORDER — DEXTROSE MONOHYDRATE 100 MG/ML
INJECTION, SOLUTION INTRAVENOUS CONTINUOUS PRN
Status: DISCONTINUED | OUTPATIENT
Start: 2024-12-05 | End: 2024-12-05 | Stop reason: HOSPADM

## 2024-12-05 RX ORDER — OXYCODONE HYDROCHLORIDE 5 MG/1
5 TABLET ORAL EVERY 6 HOURS PRN
Qty: 25 TABLET | Refills: 0 | Status: SHIPPED | OUTPATIENT
Start: 2024-12-05 | End: 2024-12-19

## 2024-12-05 RX ORDER — FENTANYL CITRATE 50 UG/ML
INJECTION, SOLUTION INTRAMUSCULAR; INTRAVENOUS
Status: DISCONTINUED | OUTPATIENT
Start: 2024-12-05 | End: 2024-12-05 | Stop reason: SDUPTHER

## 2024-12-05 RX ADMIN — ONDANSETRON 4 MG: 2 INJECTION INTRAMUSCULAR; INTRAVENOUS at 09:26

## 2024-12-05 RX ADMIN — LIDOCAINE HYDROCHLORIDE 100 MG: 20 SOLUTION INTRAVENOUS at 09:05

## 2024-12-05 RX ADMIN — OXYCODONE HYDROCHLORIDE 5 MG: 5 TABLET ORAL at 11:51

## 2024-12-05 RX ADMIN — PROPOFOL 50 MG: 10 INJECTION, EMULSION INTRAVENOUS at 10:20

## 2024-12-05 RX ADMIN — ONDANSETRON 4 MG: 2 INJECTION INTRAMUSCULAR; INTRAVENOUS at 10:18

## 2024-12-05 RX ADMIN — CEFAZOLIN 2000 MG: 1 INJECTION, POWDER, FOR SOLUTION INTRAMUSCULAR; INTRAVENOUS at 09:00

## 2024-12-05 RX ADMIN — FENTANYL CITRATE 25 MCG: 50 INJECTION INTRAMUSCULAR; INTRAVENOUS at 10:20

## 2024-12-05 RX ADMIN — HYDROMORPHONE HYDROCHLORIDE 0.2 MG: 0.5 INJECTION, SOLUTION INTRAMUSCULAR; INTRAVENOUS; SUBCUTANEOUS at 10:29

## 2024-12-05 RX ADMIN — Medication 200 MCG: at 09:20

## 2024-12-05 RX ADMIN — DEXAMETHASONE SODIUM PHOSPHATE 8 MG: 4 INJECTION, SOLUTION INTRA-ARTICULAR; INTRALESIONAL; INTRAMUSCULAR; INTRAVENOUS; SOFT TISSUE at 09:26

## 2024-12-05 RX ADMIN — DEXMEDETOMIDINE 12 MCG: 100 INJECTION, SOLUTION INTRAVENOUS at 09:00

## 2024-12-05 RX ADMIN — SUGAMMADEX 200 MG: 100 INJECTION, SOLUTION INTRAVENOUS at 10:18

## 2024-12-05 RX ADMIN — FENTANYL CITRATE 50 MCG: 50 INJECTION INTRAMUSCULAR; INTRAVENOUS at 09:05

## 2024-12-05 RX ADMIN — METOCLOPRAMIDE 10 MG: 5 INJECTION, SOLUTION INTRAMUSCULAR; INTRAVENOUS at 10:18

## 2024-12-05 RX ADMIN — SODIUM CHLORIDE: 9 INJECTION, SOLUTION INTRAVENOUS at 09:00

## 2024-12-05 RX ADMIN — MAGNESIUM SULFATE HEPTAHYDRATE 2000 MG: 40 INJECTION, SOLUTION INTRAVENOUS at 09:50

## 2024-12-05 RX ADMIN — Medication 200 MG: at 09:05

## 2024-12-05 RX ADMIN — PROPOFOL 150 MG: 10 INJECTION, EMULSION INTRAVENOUS at 09:05

## 2024-12-05 RX ADMIN — METRONIDAZOLE 500 MG: 500 INJECTION, SOLUTION INTRAVENOUS at 09:00

## 2024-12-05 RX ADMIN — HYDROMORPHONE HYDROCHLORIDE 0.3 MG: 0.5 INJECTION, SOLUTION INTRAMUSCULAR; INTRAVENOUS; SUBCUTANEOUS at 10:24

## 2024-12-05 RX ADMIN — FENTANYL CITRATE 25 MCG: 50 INJECTION INTRAMUSCULAR; INTRAVENOUS at 10:14

## 2024-12-05 RX ADMIN — KETOROLAC TROMETHAMINE 30 MG: 30 INJECTION, SOLUTION INTRAMUSCULAR at 10:18

## 2024-12-05 RX ADMIN — MIDAZOLAM 2 MG: 1 INJECTION INTRAMUSCULAR; INTRAVENOUS at 09:00

## 2024-12-05 ASSESSMENT — PAIN - FUNCTIONAL ASSESSMENT
PAIN_FUNCTIONAL_ASSESSMENT: 0-10

## 2024-12-05 ASSESSMENT — PAIN DESCRIPTION - LOCATION: LOCATION: ABDOMEN

## 2024-12-05 ASSESSMENT — PAIN DESCRIPTION - DESCRIPTORS
DESCRIPTORS: CRAMPING
DESCRIPTORS: ACHING
DESCRIPTORS: CRAMPING

## 2024-12-05 NOTE — PROGRESS NOTES
Nursing Suicide Assessment Note - Inpatient    Current assessment: Pt assessed in bedroom. Pt presents with flat affect, states, \"I'm so so. I'm sure I'll lighten up as the day goes on.\" Pt denies suicidal thinking. Pt denies homicidal thinking. Pt c/o chronic pain, refuses offer of PRN pain medication, stating, \"The pain is tolerable. I want to save the PRN for headaches.\" Pt plans on going to groups today. Pt stated, \"I went to all but one group yesterday.The guided meditation was helpful.\"     Current C-SSRS score: Negative Screen - White      Protective Factors / Reason for Living: Future orientation    Interventions:   Maintain current plan of care     X4 INCISIONS TO ABD DRY AND INTACT , DR HENRIQUEZ IN TO TALK TO PT AND PT'S BOYFRIEND , BOTH VERBALIZED UNDERSTANDING , NO DISTRESS NOTED

## 2024-12-05 NOTE — ANESTHESIA PRE PROCEDURE
Department of Anesthesiology  Preprocedure Note       Name:  Lanesha Graves   Age:  38 y.o.  :  1985                                          MRN:  [id number]         Date:  2024      Surgeon: Surgeon(s):  Corie Mcgarry MD    Procedure: Procedure(s):  ROBOTIC DIAGNOSTIC LAPAROSCOPY WITH BILATERAL OVARIAN CYSTECTOMY    Medications prior to admission:   Prior to Admission medications    Medication Sig Start Date End Date Taking? Authorizing Provider   atorvastatin (LIPITOR) 40 MG tablet Take 1 tablet by mouth daily   Yes Provider, MD Arpit   norethindrone (MICRONOR) 0.35 MG tablet TAKE 1 TABLET BY MOUTH ONCE DAILY.  NEEDS TO BE SEEN FOR FURTHER REFILLS 24  Yes Corie Mcgarry MD   labetalol (NORMODYNE) 300 MG tablet TAKE 1 TABLET BY MOUTH EVERY 8 HOURS  Patient taking differently: Take 1 tablet by mouth 2 times daily 23  Yes Corie Mcgarry MD   hydroCHLOROthiazide (HYDRODIURIL) 25 MG tablet Take 1 tablet by mouth daily 23  Yes Automatic Reconciliation, Ar   lisinopril (PRINIVIL;ZESTRIL) 20 MG tablet Take 1 tablet by mouth daily 23  Yes Automatic Reconciliation, Ar   omeprazole (PRILOSEC) 40 MG delayed release capsule 1 capsule   Yes Automatic Reconciliation, Ar       Current medications:    Current Facility-Administered Medications   Medication Dose Route Frequency Provider Last Rate Last Admin    0.9 % sodium chloride infusion   IntraVENous Continuous Corie Mcgarry MD        metroNIDAZOLE (FLAGYL) 500 mg IVPB  500 mg IntraVENous Once Corie Mcgarry MD        ceFAZolin (ANCEF) 2,000 mg in sterile water 20 mL IV syringe  2,000 mg IntraVENous Once Corie Mcgarry MD           Allergies:  No Known Allergies    Problem List:    Patient Active Problem List   Diagnosis Code    Chronic hypertension affecting pregnancy O10.919    Abnormal O'Gomez glucose challenge test, antepartum O99.810    Anxiety F41.9    Cyst of right ovary N83.201    Gastroesophageal reflux disease without

## 2024-12-05 NOTE — H&P
Gynecology History and Physical    Name: Lanesha Graves MRN: [id number] SSN: [id number]    YOB: 1985  Age: 38 y.o.  Sex: female       Subjective:      Chief complaint:  Bilateral Ovarian cyst     Lanesha is a 38 y.o.  female with a history of pelvic pain. . Previous treatment measures included nonsteroidal anti-inflammatory drugs (NSAID's). She is admitted for Procedure(s) (LRB):  ROBOTIC DIAGNOSTIC LAPAROSCOPY WITH BILATERAL OVARIAN CYSTECTOMY (Bilateral).    The current method of family planning is tubal ligation.    OB History          1    Para        Term   1            AB        Living   1         SAB        IAB        Ectopic        Molar        Multiple        Live Births                  Past Medical History:   Diagnosis Date    Anxiety     GERD (gastroesophageal reflux disease)     Headache     Hypercholesterolemia     Hypertension     Obesity     Ovarian cyst     Phobia     Psychiatric disorder      Past Surgical History:   Procedure Laterality Date     SECTION      CRYOCAUTERY OF CERVIX      OTHER SURGICAL HISTORY      biospy of gioter    TONSILLECTOMY AND ADENOIDECTOMY       Social History     Occupational History    Not on file   Tobacco Use    Smoking status: Former    Smokeless tobacco: Never   Vaping Use    Vaping status: Never Used   Substance and Sexual Activity    Alcohol use: Never    Drug use: Never    Sexual activity: Yes     Partners: Male     Birth control/protection: None     Family History   Problem Relation Age of Onset    Hypertension Paternal Grandmother     Diabetes Paternal Grandmother     Hypertension Maternal Grandmother     Diabetes Maternal Grandmother         No Known Allergies  Prior to Admission medications    Medication Sig Start Date End Date Taking? Authorizing Provider   atorvastatin (LIPITOR) 40 MG tablet Take 1 tablet by mouth daily   Yes Provider, MD Arpit   norethindrone (MICRONOR) 0.35 MG tablet TAKE 1 TABLET

## 2024-12-05 NOTE — OP NOTE
Name: Lanesha Graves   Medical Record Number: [id number]   YOB: 1985  Today's Date: December 5, 2024      Pre-operative Diagnosis: Ovarian cyst, bilateral [N83.201, N83.202]  Pelvic pain in female [R10.2]    Post-operative Diagnosis:same    Operation:  Procedure(s):  ROBOTIC DIAGNOSTIC LAPAROSCOPY WITH BILATERAL OVARIAN CYSTECTOMY AND LYSIS OF ADHESION    Surgeon(s):  Corie Mcgarry MD    Anesthesia: General    Prophylactic Antibiotics: Ancef  DVT Prophylaxis: Sequential Compression Devices     Specimens: RIGHT OVARIAN CYST WALL                        LEFT OVARIAN CYST WALL           Complications:  none    Procedure Detail:      After consent was obtained patient was taken to the operating room with a running IV. Patient was then intubated and placed in the lithotomy position. Patient was then prepped and draped in usual sterile fashion and time out was performed. Sterile speculum was placed in patient's vagina with good visualization of the cervix. Cervix was grasped with a single-tooth tenaculum and the uterine manipulator was then placed. A sterile Morejon was placed to monitor urinary output during procedure. Attention was then turned to the abdomen. Infraumbilical skin incision was made with 11 blade and Veress needle placed with a positive saline drop confirming intra-abdominal placement. Abdomen was then insufflated with the CO2 maintaining 15 mmHg. Patient was placed in Trendelenburg position and 8 mm scope inserted. Upon insertion of the scope, pelvis was inspected and cm ovarian cyst was visualized. 2 additional 8 mm trochars were placed in the left and right lower quadrant and additional 8 mm tochar 5 cm from superior iliac spine.. Lantronixinci robotic system was then docked without problems. Hot shear was placed on the right side and fenestrated bipolar forceps on the left.   Pelvic has significant adhesive disease , Thee was thick adhesions from the omentum to ant abd wall. Sharp dissection

## 2024-12-19 NOTE — ANESTHESIA POSTPROCEDURE EVALUATION
Department of Anesthesiology  Postprocedure Note    Patient: Lanesha Graves  MRN: [id number]  YOB: 1985    Procedure Summary       Date: 12/05/24 Room / Location: [facility] MAIN OR 05 / [facility] MAIN OR    Anesthesia Start: 0900 Anesthesia Stop: 1043    Procedure: ROBOTIC DIAGNOSTIC LAPAROSCOPY WITH BILATERAL OVARIAN CYSTECTOMY AND LYSIS OF ADHESIONS (Bilateral: Abdomen) Diagnosis:       Ovarian cyst, bilateral      Pelvic pain in female      (Ovarian cyst, bilateral [N83.201, N83.202])      (Pelvic pain in female [R10.2])    Surgeons: Corie Mcgarry MD Responsible Provider: Kuldip Hodge MD    Anesthesia Type: General ASA Status: 2            Anesthesia Type: General    Aaliyah Phase I: Aaliyah Score: 9    Aaliyah Phase II: Aaliyah Score: 10    Anesthesia Post Evaluation    Patient location during evaluation: PACU  Patient participation: complete - patient cannot participate  Level of consciousness: awake  Pain score: 0  Airway patency: patent  Nausea & Vomiting: no nausea and no vomiting  Cardiovascular status: hemodynamically stable  Respiratory status: acceptable  Hydration status: stable  Multimodal analgesia pain management approach        No notable events documented.

## 2024-12-20 ENCOUNTER — OFFICE VISIT (OUTPATIENT)
Age: 39
End: 2024-12-20

## 2024-12-20 DIAGNOSIS — Z09 POSTOP CHECK: Primary | ICD-10-CM

## 2024-12-20 PROCEDURE — 99024 POSTOP FOLLOW-UP VISIT: CPT | Performed by: OBSTETRICS & GYNECOLOGY

## 2024-12-20 NOTE — PROGRESS NOTES
Lanesha Graves is a 38 y.o. female presents for postop care 14 days following DAVINCI BILATERAL OVARIAN CYSTECTOMY.   Appetite is good. Eating a regular diet without difficulty.   Bowel movements are regular.  The patient is voiding without difficulty.  The patient is not having any pain..    Objective:     LMP 10/29/2024 (Exact Date)     General:  alert, appears stated age, and cooperative   Abdomen: soft, bowel sounds active, non-tender   Incision:   healing well, no drainage, no erythema, no hernia, no seroma, no swelling, no dehiscence, incision well approximated     Assessment:     Doing well postoperatively.    Plan:     1. Continue any current medications.  2. Wound care discussed.  3. Pt is to increase activities as tolerated.  .

## 2025-01-15 ENCOUNTER — TRANSCRIBE ORDERS (OUTPATIENT)
Facility: HOSPITAL | Age: 40
End: 2025-01-15

## 2025-01-15 DIAGNOSIS — K86.89 MASS OF PANCREAS: Primary | ICD-10-CM

## 2025-02-03 ENCOUNTER — HOSPITAL ENCOUNTER (OUTPATIENT)
Facility: HOSPITAL | Age: 40
Discharge: HOME | End: 2025-02-06
Payer: COMMERCIAL

## 2025-02-03 DIAGNOSIS — K86.89 MASS OF PANCREAS: ICD-10-CM

## 2025-02-03 LAB — CREAT BLD-MCNC: 0.9 MG/DL (ref 0.6–1.3)

## 2025-02-03 PROCEDURE — 74160 CT ABDOMEN W/CONTRAST: CPT

## 2025-02-03 PROCEDURE — 82565 ASSAY OF CREATININE: CPT

## 2025-02-03 PROCEDURE — 6360000004 HC RX CONTRAST MEDICATION: Performed by: PHYSICIAN ASSISTANT

## 2025-02-03 RX ORDER — IOPAMIDOL 755 MG/ML
100 INJECTION, SOLUTION INTRAVASCULAR
Status: COMPLETED | OUTPATIENT
Start: 2025-02-03 | End: 2025-02-03

## 2025-02-03 RX ADMIN — IOPAMIDOL 100 ML: 755 INJECTION, SOLUTION INTRAVENOUS at 14:26

## 2025-03-13 ENCOUNTER — HOSPITAL ENCOUNTER (OUTPATIENT)
Facility: HOSPITAL | Age: 40
Discharge: HOME | End: 2025-03-16
Payer: COMMERCIAL

## 2025-03-13 DIAGNOSIS — Z12.31 VISIT FOR SCREENING MAMMOGRAM: ICD-10-CM

## 2025-03-13 PROCEDURE — 77063 BREAST TOMOSYNTHESIS BI: CPT

## 2025-05-06 ENCOUNTER — TELEPHONE (OUTPATIENT)
Age: 40
End: 2025-05-06

## 2025-05-06 DIAGNOSIS — N92.6 IRREGULAR MENSES: ICD-10-CM

## 2025-05-06 RX ORDER — ACETAMINOPHEN AND CODEINE PHOSPHATE 120; 12 MG/5ML; MG/5ML
SOLUTION ORAL
Qty: 28 TABLET | Refills: 1 | Status: SHIPPED | OUTPATIENT
Start: 2025-05-06

## 2025-05-06 NOTE — TELEPHONE ENCOUNTER
Pt requesting a refill for her birth control to be sent to the [facility] on [address] in [place], [place]

## 2025-06-03 ENCOUNTER — OFFICE VISIT (OUTPATIENT)
Age: 40
End: 2025-06-03
Payer: COMMERCIAL

## 2025-06-03 ENCOUNTER — TELEPHONE (OUTPATIENT)
Age: 40
End: 2025-06-03

## 2025-06-03 VITALS
WEIGHT: 204.13 LBS | SYSTOLIC BLOOD PRESSURE: 149 MMHG | HEIGHT: 62 IN | BODY MASS INDEX: 37.56 KG/M2 | DIASTOLIC BLOOD PRESSURE: 85 MMHG

## 2025-06-03 DIAGNOSIS — Z11.3 SCREENING FOR VENEREAL DISEASE: ICD-10-CM

## 2025-06-03 DIAGNOSIS — Z00.00 ANNUAL VISIT FOR GENERAL ADULT MEDICAL EXAMINATION WITHOUT ABNORMAL FINDINGS: ICD-10-CM

## 2025-06-03 DIAGNOSIS — Z11.51 SCREENING FOR HUMAN PAPILLOMAVIRUS: ICD-10-CM

## 2025-06-03 DIAGNOSIS — Z01.419 PAP SMEAR, AS PART OF ROUTINE GYNECOLOGICAL EXAMINATION: Primary | ICD-10-CM

## 2025-06-03 PROCEDURE — 3079F DIAST BP 80-89 MM HG: CPT | Performed by: OBSTETRICS & GYNECOLOGY

## 2025-06-03 PROCEDURE — 99396 PREV VISIT EST AGE 40-64: CPT | Performed by: OBSTETRICS & GYNECOLOGY

## 2025-06-03 PROCEDURE — 3077F SYST BP >= 140 MM HG: CPT | Performed by: OBSTETRICS & GYNECOLOGY

## 2025-06-03 NOTE — TELEPHONE ENCOUNTER
Patient contacted the office reports she is having abdominal pain after exam. She asked how long she would hurt from the exam advised unsure but she could take Tylenol or Motrin for discomfort and update us tomorrow if no better.  She denies any bleeding at this time.  She is aware to call or send [employer] message with updates. Message forwarded to provider for review.

## 2025-06-03 NOTE — PROGRESS NOTES
Tobacco Use    Smoking status: Former    Smokeless tobacco: Never   Vaping Use    Vaping status: Never Used   Substance and Sexual Activity    Alcohol use: Never    Drug use: Never    Sexual activity: Yes     Partners: Male     Birth control/protection: None   Other Topics Concern    Not on file   Social History Narrative    Not on file     Social Drivers of Health     Financial Resource Strain: Not on file   Food Insecurity: Not on file   Transportation Needs: Not on file   Physical Activity: Not on file   Stress: Not on file   Social Connections: Not on file   Intimate Partner Violence: Not on file   Housing Stability: Not on file         Review of Systems     Review of Systems   Constitutional: Negative.    HENT: Negative.    Eyes: Negative.    Respiratory: Negative.    Cardiovascular: Negative.    Gastrointestinal: Negative.    Genitourinary: Negative.    Musculoskeletal: Negative.    Skin: Negative.    Neurological: Negative.    Endo/Heme/Allergies: Negative.    Psychiatric/Behavioral: Negative.      BP (!) 149/85   Ht 1.575 m (5' 2\")   Wt 92.6 kg (204 lb 2 oz)   BMI 37.33 kg/m²   OBGyn Exam   Constitutional  Appearance: well-nourished, well developed, alert, in no acute distress    HENT  Head and Face: appears normal    Neck  Inspection/Palpation: normal appearance, no masses or tenderness  Lymph Nodes: no lymphadenopathy present  Thyroid: gland size normal, nontender, no nodules or masses present on palpation    Breasts  Symmetric, no palpable masses, no tenderness, no skin changes, no nipple abnormality, no nipple discharge, no lymphadenopathy.    Chest  Respiratory Effort: breathing labored  Auscultation: normal breath sounds    Cardiovascular  Heart:  Auscultation: regular rate and rhythm without murmur    Gastrointestinal  Abdominal Examination: abdomen non-tender to palpation, normal bowel sounds, no masses present  Liver and spleen: no hepatomegaly present, spleen not palpable  Hernias: no hernias

## 2025-06-06 ENCOUNTER — RESULTS FOLLOW-UP (OUTPATIENT)
Age: 40
End: 2025-06-06

## 2025-06-06 LAB
., LABCORP: NORMAL
C TRACH RRNA CVX QL NAA+PROBE: NEGATIVE
CYTOLOGIST CVX/VAG CYTO: NORMAL
CYTOLOGY CVX/VAG DOC CYTO: NORMAL
CYTOLOGY CVX/VAG DOC THIN PREP: NORMAL
DX ICD CODE: NORMAL
N GONORRHOEA RRNA CVX QL NAA+PROBE: NEGATIVE
OTHER STN SPEC: NORMAL
SERVICE CMNT-IMP: NORMAL
STAT OF ADQ CVX/VAG CYTO-IMP: NORMAL
T VAGINALIS RRNA SPEC QL NAA+PROBE: NEGATIVE

## 2025-06-10 ENCOUNTER — ANESTHESIA EVENT (OUTPATIENT)
Facility: HOSPITAL | Age: 40
End: 2025-06-10
Payer: COMMERCIAL

## 2025-06-11 RX ORDER — LUBIPROSTONE 24 UG/1
24 CAPSULE ORAL 2 TIMES DAILY WITH MEALS
COMMUNITY

## 2025-06-11 NOTE — ANESTHESIA PRE PROCEDURE
history of anesthetic complications:   Airway: Mallampati: I  TM distance: >3 FB   Neck ROM: full  Mouth opening: > = 3 FB   Dental: normal exam         Pulmonary:Negative Pulmonary ROS and normal exam  breath sounds clear to auscultation                             Cardiovascular:  Exercise tolerance: good (>4 METS)  (+) hypertension:, hyperlipidemia        Rhythm: regular  Rate: normal                 ROS comment: Echo:  ·  Left Ventricle: Normal left ventricular systolic function with a visually estimated EF of 55 - 60%. Left ventricle size is normal. Normal wall thickness. Normal wall motion. Normal diastolic function.  ·  Mitral Valve: Mildly thickened leaflet.  ·  Tricuspid Valve: The estimated RVSP is 60 mmHg.Mild TR  ·  Left Atrium: Left atrium is mildly dilated.  ·  Pericardium: Right pleural effusion.     Neuro/Psych:   (+) headaches:, psychiatric history:depression/anxiety             GI/Hepatic/Renal:   (+) GERD:          Endo/Other: Negative Endo/Other ROS                    Abdominal:              PE comment: Deferred.   Vascular: negative vascular ROS.         Other Findings:         Anesthesia Plan      MAC     ASA 2       Induction: intravenous.      Anesthetic plan and risks discussed with patient (and family, if present.).      Plan discussed with CRNA.                  Shane Huston MD   6/10/2025

## 2025-06-12 ENCOUNTER — HOSPITAL ENCOUNTER (OUTPATIENT)
Facility: HOSPITAL | Age: 40
Setting detail: OUTPATIENT SURGERY
Discharge: HOME | End: 2025-06-12
Attending: INTERNAL MEDICINE | Admitting: INTERNAL MEDICINE
Payer: COMMERCIAL

## 2025-06-12 ENCOUNTER — ANESTHESIA (OUTPATIENT)
Facility: HOSPITAL | Age: 40
End: 2025-06-12
Payer: COMMERCIAL

## 2025-06-12 VITALS
SYSTOLIC BLOOD PRESSURE: 137 MMHG | HEIGHT: 62 IN | RESPIRATION RATE: 18 BRPM | WEIGHT: 202 LBS | OXYGEN SATURATION: 100 % | BODY MASS INDEX: 37.17 KG/M2 | TEMPERATURE: 98.1 F | HEART RATE: 89 BPM | DIASTOLIC BLOOD PRESSURE: 82 MMHG

## 2025-06-12 LAB — HCG UR QL: NEGATIVE

## 2025-06-12 PROCEDURE — 3600007502: Performed by: INTERNAL MEDICINE

## 2025-06-12 PROCEDURE — 3600007512: Performed by: INTERNAL MEDICINE

## 2025-06-12 PROCEDURE — 88305 TISSUE EXAM BY PATHOLOGIST: CPT

## 2025-06-12 PROCEDURE — 2580000003 HC RX 258: Performed by: INTERNAL MEDICINE

## 2025-06-12 PROCEDURE — 81025 URINE PREGNANCY TEST: CPT

## 2025-06-12 PROCEDURE — 7100000011 HC PHASE II RECOVERY - ADDTL 15 MIN: Performed by: INTERNAL MEDICINE

## 2025-06-12 PROCEDURE — 3700000001 HC ADD 15 MINUTES (ANESTHESIA): Performed by: INTERNAL MEDICINE

## 2025-06-12 PROCEDURE — 3700000000 HC ANESTHESIA ATTENDED CARE: Performed by: INTERNAL MEDICINE

## 2025-06-12 PROCEDURE — 2709999900 HC NON-CHARGEABLE SUPPLY: Performed by: INTERNAL MEDICINE

## 2025-06-12 PROCEDURE — 6360000002 HC RX W HCPCS: Performed by: NURSE ANESTHETIST, CERTIFIED REGISTERED

## 2025-06-12 PROCEDURE — 7100000010 HC PHASE II RECOVERY - FIRST 15 MIN: Performed by: INTERNAL MEDICINE

## 2025-06-12 RX ORDER — SODIUM CHLORIDE 0.9 % (FLUSH) 0.9 %
5-40 SYRINGE (ML) INJECTION PRN
Status: DISCONTINUED | OUTPATIENT
Start: 2025-06-12 | End: 2025-06-12 | Stop reason: HOSPADM

## 2025-06-12 RX ORDER — SODIUM CHLORIDE 9 MG/ML
INJECTION, SOLUTION INTRAVENOUS PRN
Status: DISCONTINUED | OUTPATIENT
Start: 2025-06-12 | End: 2025-06-12 | Stop reason: HOSPADM

## 2025-06-12 RX ORDER — SODIUM CHLORIDE 0.9 % (FLUSH) 0.9 %
5-40 SYRINGE (ML) INJECTION EVERY 12 HOURS SCHEDULED
Status: DISCONTINUED | OUTPATIENT
Start: 2025-06-12 | End: 2025-06-12 | Stop reason: HOSPADM

## 2025-06-12 RX ADMIN — PROPOFOL 150 MCG/KG/MIN: 10 INJECTION, EMULSION INTRAVENOUS at 09:16

## 2025-06-12 RX ADMIN — PROPOFOL 80 MG: 10 INJECTION, EMULSION INTRAVENOUS at 09:17

## 2025-06-12 RX ADMIN — PROPOFOL 70 MG: 10 INJECTION, EMULSION INTRAVENOUS at 09:19

## 2025-06-12 RX ADMIN — PROPOFOL 50 MG: 10 INJECTION, EMULSION INTRAVENOUS at 09:24

## 2025-06-12 RX ADMIN — LIDOCAINE HYDROCHLORIDE 100 MG: 20 INJECTION, SOLUTION EPIDURAL; INFILTRATION; INTRACAUDAL; PERINEURAL at 09:16

## 2025-06-12 RX ADMIN — SODIUM CHLORIDE: 0.9 INJECTION, SOLUTION INTRAVENOUS at 09:01

## 2025-06-12 ASSESSMENT — PAIN - FUNCTIONAL ASSESSMENT: PAIN_FUNCTIONAL_ASSESSMENT: 0-10

## 2025-06-12 NOTE — DISCHARGE INSTRUCTIONS
[place] Office: [phone]    Lanesha Graves  [id number]  1985    EGD/COLONOSCOPY DISCHARGE INSTRUCTIONS    CALL M.D.  ANY SIGN OF   Increasing pain, nausea, vomiting  Abdominal distension (swelling)  New increased bleeding (oral or rectal)  Fever (chills)  Pain in chest area  Bloody discharge from nose or mouth  Shortness of breath    For 24 hours after general anesthesia or intravenous analgesia / sedation  you may experience:  Drowsiness, dizziness, sleepiness, or confusion  Difficulty remembering or delayed reaction times  Difficulty with your balance, especially while walking, move slowly and carefully, do not make sudden position changes  Difficulty focusing or blurred vision    Discomfort:  Sore throat- throat lozenges or warm salt water gargle  redness at IV site- apply warm compress to area; if redness or soreness persist- contact your physician  Gaseous discomfort- walking, belching will help relieve any discomfort    DIET  You may resume your regular diet - however -  remember your colon is empty and a heavy meal will produce gas.   Avoid these foods:  fried / greasy foods, excessive carbonated drinks or too much caffeine    MEDICATIONS   You may not take any Advil, Aspirin, Ibuprofen, Motrin or Aleve(NSAIDs) for 7 days, ONLY  Tylenol as needed for pain.    ACTIVITY  You may resume your normal daily activities.   Spend the remainder of the day resting -  avoid any strenuous activity.  You may not operate a vehicle for 12 hours  You may not engage in an occupation involving machinery or appliances for rest of today.  You may not drink alcoholic beverages for at least 12 hours  Avoid making any critical decisions for at least 24 hour    Findings:    Endoscopic:    E: 3-4 cm hiatal hernia,    G: Mild diffuse gastropathy(biopsied) with few upper body fundic gland like polyps(biopsied).    D: normal bulb    Ultrasound:   Esophagus: normal findings   Stomach: see above   Pancreas:     Areas examined:

## 2025-06-12 NOTE — OP NOTE
NAME:  Lanesha Graves   :   1985   MRN:   [id number]     [facility]    Date/Time:  2025   Procedure Type: EUS and EGD  with gastric biopsies      Indications: Abnormal CT scan showing mass close to tail of pancreas    Pre-operative Diagnosis: see indication above    Post-operative Diagnosis:  See findings below    : Lui Alvarez MD    Referring Provider: --Martha Alberto FNP    Procedure Details:    Exam:  Airway: clear, no airway problems anticipated  Heart: RRR, without gallops or rubs  Lungs: clear bilaterally without wheezes, crackles, or rhonchi  Abdomen: soft, nontender, nondistended, bowel sounds present  Mental Status: awake, alert and oriented to person, place and time     Anethesia/Sedation:  MAC anesthesia Propofol      Procedure Details   After a detailed informed consent was obtained for the procedure, with all risks and benefits of procedure explained the patient was taken to the endoscopy suite and placed in the left lateral decubitus position.  Following sequential administration of sedation as per above, the linear echoendoscope  [id number] was inserted into the mouth and advanced under direct vision to second portion of the duodenum.  A careful inspection was made as the gastroscope was withdrawn, including a retroflexed view of the proximal stomach; findings and interventions are described below.   Findings:     Endoscopic:       E: 3-4 cm hiatal hernia,    G: Mild diffuse gastropathy(biopsied) with few upper body fundic gland like polyps(biopsied).    D: normal bulb    Ultrasound:   Esophagus: normal findings   Stomach: see above   Pancreas:     Areas examined: the entire gland    Parenchyma: -normal pancreatogram, including the main pancreatic duct and side branches, -normal parenchyma    Pancreatic Duct: normal findings    On endoscopic ultrasonography an oval-shaped, 17 x 10 mm mildly hypoechoic lesion with district borders,  is noted close to the tail of the  pancreas.  This is present between the tail of the pancreas and the spleen.  The echogenicity is similar  to that of the spleen suggesting the lesion to be compatible with a splenule.  I reviewed her CT scans with the radiologist, in person.  I did not perform a FNB or  fine-needle aspiration secondary to increased risk of bleeding as this lesion is suggestive of a splenule rather than a lesion in the pancreas.     Liver:     Parenchyma: Diffusely hyperechoic    Gallbladder: not examined    Bile Duct: the common bile duct is normal               Lymph Node: no adenopathy        Specimen Removed:  None    Complications: None.   EBL:  None.    Interventions: NA    Recommendations:     Follow up on biopsy results.  F.u in clinic.  Repeat CT AP in 6 months for stability of lesion.    RAYMOND Alvarez MD

## 2025-06-12 NOTE — ANESTHESIA POSTPROCEDURE EVALUATION
Department of Anesthesiology  Postprocedure Note    Patient: Lanesha Graves  MRN: [id number]  YOB: 1985  Date of evaluation: 6/12/2025    Procedure Summary       Date: 06/12/25 Room / Location: [facility] ENDO 04 / [facility] ENDOSCOPY    Anesthesia Start: 0910 Anesthesia Stop: 0937    Procedure: LINEAR ENDOSCOPIC ULTRASOUND Diagnosis:       Mass of pancreas      Left flank pain      Constipation, unspecified constipation type      Abnormal MRI of the abdomen      Abnormal findings on diagnostic imaging of other parts of digestive tract      (Mass of pancreas [K86.89])      (Left flank pain [R10.9])      (Constipation, unspecified constipation type [K59.00])      (Abnormal MRI of the abdomen [R93.5])      (Abnormal findings on diagnostic imaging of other parts of digestive tract [R93.3])    Surgeons: Herman Alvarez MD Responsible Provider: Shane Huston MD    Anesthesia Type: MAC, TIVA ASA Status: 2            Anesthesia Type: MAC, TIVA    Aaliyah Phase I: Aaliyah Score: 10    Aaliyah Phase II: Aaliyah Score: 10    Anesthesia Post Evaluation    Patient location during evaluation: bedside  Patient participation: complete - patient participated  Level of consciousness: awake  Pain score: 0  Airway patency: patent  Nausea & Vomiting: no nausea and no vomiting  Cardiovascular status: hemodynamically stable  Respiratory status: acceptable  Hydration status: euvolemic  Pain management: adequate    No notable events documented.

## 2025-06-12 NOTE — PROGRESS NOTES
ARRIVAL INFORMATION:  Verified patient name and date of birth, scheduled procedure, and informed consent.     : Daren Hernandez (boyfriend) contact number: [phone]  Physician and staff can share information with the .     Receive texts: yes    Belongings with patient include:  Clothing,Glasses, Jewelry (1 pair of earrings removed and in belonging bag, 1 ring on patient)    GI FOCUSED ASSESSMENT:  Neuro: Awake, alert, oriented x4  Respiratory: even and unlabored   GI: soft and non-distended  EKG Rhythm: normal sinus rhythm    Education:Reviewed general discharge instructions and  information.  The risks and benefits of the bite block have been explained to patient.  Patient verbalizes understanding.

## 2025-06-12 NOTE — H&P
Pre-endoscopy H and P    The patient was seen and examined in the room/pre-op holding area.  The airway was assessed and documented.  The problem list, past medical history, and medications were reviewed.     Patient Active Problem List   Diagnosis    Chronic hypertension affecting pregnancy    Abnormal O'Gomez glucose challenge test, antepartum    Anxiety    Cyst of right ovary    Gastroesophageal reflux disease without esophagitis    Goiter    Migraine without aura    Pure hypercholesterolemia    High-risk pregnancy in third trimester    Group B Streptococcus carrier state affecting pregnancy    Positive GBS test    39 weeks gestation of pregnancy    Excessive fetal growth affecting management of pregnancy in third trimester    Hypertension    Preeclampsia in postpartum period    Pulmonary infiltrates on CXR    Ovarian cyst, bilateral    Pelvic pain in female     Social History     Socioeconomic History    Marital status: Single     Spouse name: Not on file    Number of children: Not on file    Years of education: Not on file    Highest education level: Not on file   Occupational History    Not on file   Tobacco Use    Smoking status: Never    Smokeless tobacco: Former   Vaping Use    Vaping status: Never Used   Substance and Sexual Activity    Alcohol use: Never    Drug use: Never    Sexual activity: Yes     Partners: Male     Birth control/protection: None   Other Topics Concern    Not on file   Social History Narrative    Not on file     Social Drivers of Health     Financial Resource Strain: Not on file   Food Insecurity: Not on file   Transportation Needs: Not on file   Physical Activity: Not on file   Stress: Not on file   Social Connections: Not on file   Intimate Partner Violence: Not on file   Housing Stability: Not on file     Past Medical History:   Diagnosis Date    Anxiety     GERD (gastroesophageal reflux disease)     Headache     Hypercholesterolemia     Hypertension     Obesity     Ovarian cyst

## 2025-07-08 DIAGNOSIS — N92.6 IRREGULAR MENSES: ICD-10-CM

## 2025-07-08 RX ORDER — ACETAMINOPHEN AND CODEINE PHOSPHATE 120; 12 MG/5ML; MG/5ML
SOLUTION ORAL
Qty: 28 TABLET | Refills: 11 | Status: SHIPPED | OUTPATIENT
Start: 2025-07-08

## (undated) DEVICE — SET ADMIN 16ML TBNG L100IN 2 Y INJ SITE IV PIGGY BK DISP (ORDER IN MULIPLES OF 48)

## (undated) DEVICE — TISSUE RETRIEVAL SYSTEM: Brand: INZII RETRIEVAL SYSTEM

## (undated) DEVICE — C-SECTION: Brand: MEDLINE INDUSTRIES, INC.

## (undated) DEVICE — BLADE,CARBON-STEEL,11,STRL,DISPOSABLE,TB: Brand: MEDLINE

## (undated) DEVICE — DRAPE C-SECTION W/WINDOW --

## (undated) DEVICE — LARGE, DISPOSABLE ALEXIS O C-SECTION PROTECTOR - RETRACTOR: Brand: ALEXIS ® O C-SECTION PROTECTOR - RETRACTOR

## (undated) DEVICE — CATHETER IV 20 GAX1 IN N WNG KINK RESIST INSYT AUTOGRD

## (undated) DEVICE — Device

## (undated) DEVICE — CONTAINER FORMALIN PREFILLED 10% NBF 40ML

## (undated) DEVICE — SENSOR PLSE OXMTR NEO AD 40KG ADH DISP NELLCOR

## (undated) DEVICE — LIQUIBAND RAPID ADHESIVE 36/CS 0.8ML: Brand: MEDLINE

## (undated) DEVICE — TRAY URIN CATH PED 16FR BLLN 5CC INDWL STR TIP INF CTRL

## (undated) DEVICE — STERILE POLYISOPRENE POWDER-FREE SURGICAL GLOVES WITH EMOLLIENT COATING: Brand: PROTEXIS

## (undated) DEVICE — GARMENT CMPR STD UNV CALF FLWT -- RN VENTILATE NS DISP 17- IN

## (undated) DEVICE — DRAPE,UTILITY,TAPE,15X26,STERILE: Brand: MEDLINE

## (undated) DEVICE — ARM DRAPE

## (undated) DEVICE — HANDLE SURG LIGHT OB

## (undated) DEVICE — SOL IRR SOD CL 0.9% 1000ML BTL --

## (undated) DEVICE — SUTURE ABSORBABLE BRAIDED 0 CT 36 IN DA UD VICRYL VCP958H

## (undated) DEVICE — SOLUTION IV 1000ML 0.9% SOD CHL PH 5 INJ USP VIAFLX PLAS

## (undated) DEVICE — SUTURE MONOCRYL + SZ 4-0 L27IN ABSRB UD L19MM PS-2 3/8 CIR MCP426H

## (undated) DEVICE — APPLICATOR SCRB 26ML TEAL STRL -- CHLORAPREP 26ML

## (undated) DEVICE — BLADELESS OBTURATOR: Brand: WECK VISTA

## (undated) DEVICE — IV STRT KT 3282] LSL INDUSTRIES INC]

## (undated) DEVICE — BLOCK BITE ENDO W/ STRP SLD DISP

## (undated) DEVICE — SUTURE VCRL + SZ 2-0 L36IN ABSRB UD L36MM CT-1 1/2 CIR VCP945H

## (undated) DEVICE — SEAL

## (undated) DEVICE — SUTURE ABSRB BRAID COAT UD CT NO 1 36IN VCRL J959H

## (undated) DEVICE — DRAPE CLAMP,DISPOSABLE: Brand: DEVON

## (undated) DEVICE — DERMABOND SKIN ADH 0.7ML -- DERMABOND ADVANCED 12/BX

## (undated) DEVICE — STAPLER SKIN SQ 30 ABSRB STPL DISP INSORB

## (undated) DEVICE — ELECTRODE,RADIOTRANSLUCENT,FOAM,5PK: Brand: MEDLINE

## (undated) DEVICE — GLOVE ORANGE PI 8   MSG9080

## (undated) DEVICE — GYN-URO ROBOT: Brand: MEDLINE INDUSTRIES, INC.

## (undated) DEVICE — BAG,SPONGE COUNTER,CLEAR,50/BX,5BX/CS: Brand: MEDLINE

## (undated) DEVICE — SYRINGE MED 10ML LUERLOCK TIP W/O SFTY DISP

## (undated) DEVICE — SOLUTION IRRIG 500ML 0.9% SOD CHLO USP POUR PLAS BTL

## (undated) DEVICE — KIT,1200CC RIGID CANISTER,6' AND 20" TUB: Brand: MEDLINE INDUSTRIES, INC.

## (undated) DEVICE — COLUMN DRAPE

## (undated) DEVICE — GLOVE ORANGE PI 7 1/2   MSG9075

## (undated) DEVICE — INSUFFLATION NEEDLE TO ESTABLISH PNEUMOPERITONEUM.: Brand: INSUFFLATION NEEDLE

## (undated) DEVICE — REM POLYHESIVE ADULT PATIENT RETURN ELECTRODE: Brand: VALLEYLAB

## (undated) DEVICE — SOLIDIFIER FLD 2OZ 1500CC N DISINF IN BTL DISP SAFESORB

## (undated) DEVICE — CUFF BLD PRSS AD CLTH SGL TB W/ BAYNT CONN ROUNDED CORNER

## (undated) DEVICE — FORCEP BX LG CAP 2.4 MMX120 CM W/ NDL YEL RADIAL JAW 4 DISP